# Patient Record
Sex: FEMALE | Race: BLACK OR AFRICAN AMERICAN | NOT HISPANIC OR LATINO | Employment: OTHER | URBAN - METROPOLITAN AREA
[De-identification: names, ages, dates, MRNs, and addresses within clinical notes are randomized per-mention and may not be internally consistent; named-entity substitution may affect disease eponyms.]

---

## 2023-08-24 DIAGNOSIS — I20.9 ANGINA PECTORIS (CMS-HCC): ICD-10-CM

## 2023-08-24 PROBLEM — R06.02 EXERTIONAL SHORTNESS OF BREATH: Status: ACTIVE | Noted: 2023-08-24

## 2023-08-24 PROBLEM — I25.10 CORONARY ARTERY DISEASE: Status: ACTIVE | Noted: 2023-08-24

## 2023-08-24 PROBLEM — H04.123 DRY EYES, BILATERAL: Status: ACTIVE | Noted: 2023-08-24

## 2023-08-24 PROBLEM — Z86.010 HISTORY OF ADENOMATOUS POLYP OF COLON: Status: ACTIVE | Noted: 2023-08-24

## 2023-08-24 PROBLEM — G25.89 SCAPULAR DYSKINESIS: Status: ACTIVE | Noted: 2023-08-24

## 2023-08-24 PROBLEM — I35.8 AORTIC SYSTOLIC MURMUR ON EXAMINATION: Status: ACTIVE | Noted: 2023-08-24

## 2023-08-24 PROBLEM — Z86.0101 HISTORY OF ADENOMATOUS POLYP OF COLON: Status: ACTIVE | Noted: 2023-08-24

## 2023-08-24 PROBLEM — Z86.79 HISTORY OF CORONARY ARTERY DISEASE: Status: ACTIVE | Noted: 2023-08-24

## 2023-08-24 PROBLEM — Z98.61 HISTORY OF PERCUTANEOUS TRANSLUMINAL CORONARY ANGIOPLASTY: Status: ACTIVE | Noted: 2023-08-24

## 2023-08-24 PROBLEM — R22.0 SWELLING OF UPPER LIP: Status: ACTIVE | Noted: 2023-08-24

## 2023-08-24 PROBLEM — M67.51 PLICA SYNDROME, RIGHT KNEE: Status: ACTIVE | Noted: 2023-08-24

## 2023-08-24 PROBLEM — M62.838 TRAPEZIUS MUSCLE SPASM: Status: ACTIVE | Noted: 2023-08-24

## 2023-08-24 PROBLEM — E11.9 CONTROLLED DIABETES MELLITUS TYPE II WITHOUT COMPLICATION (MULTI): Status: ACTIVE | Noted: 2023-08-24

## 2023-08-24 PROBLEM — E11.9 DIABETES MELLITUS (MULTI): Status: ACTIVE | Noted: 2023-08-24

## 2023-08-24 PROBLEM — M25.50 ARTHRALGIA OF MULTIPLE JOINTS: Status: ACTIVE | Noted: 2023-08-24

## 2023-08-24 PROBLEM — J40 BRONCHITIS: Status: ACTIVE | Noted: 2023-08-24

## 2023-08-24 PROBLEM — I10 HYPERTENSION: Status: ACTIVE | Noted: 2023-08-24

## 2023-08-24 PROBLEM — M25.561 RIGHT KNEE PAIN: Status: ACTIVE | Noted: 2023-08-24

## 2023-08-24 PROBLEM — G89.29 CHRONIC LOW BACK PAIN: Status: ACTIVE | Noted: 2023-08-24

## 2023-08-24 PROBLEM — R91.8 LUNG NODULE, MULTIPLE: Status: ACTIVE | Noted: 2023-08-24

## 2023-08-24 PROBLEM — H26.492 PCO (POSTERIOR CAPSULAR OPACIFICATION), LEFT: Status: ACTIVE | Noted: 2023-08-24

## 2023-08-24 PROBLEM — J44.89: Status: ACTIVE | Noted: 2023-08-24

## 2023-08-24 PROBLEM — I65.29 CAROTID STENOSIS: Status: ACTIVE | Noted: 2023-08-24

## 2023-08-24 PROBLEM — E78.00 HYPERCHOLESTEROLEMIA: Status: ACTIVE | Noted: 2023-08-24

## 2023-08-24 PROBLEM — R42 DIZZINESS: Status: ACTIVE | Noted: 2023-08-24

## 2023-08-24 PROBLEM — T78.40XA ALLERGIC REACTION: Status: ACTIVE | Noted: 2023-08-24

## 2023-08-24 PROBLEM — M54.50 CHRONIC LOW BACK PAIN: Status: ACTIVE | Noted: 2023-08-24

## 2023-08-24 PROBLEM — I73.9 PAD (PERIPHERAL ARTERY DISEASE) (CMS-HCC): Status: ACTIVE | Noted: 2023-08-24

## 2023-08-24 PROBLEM — Z96.1 PSEUDOPHAKIA OF BOTH EYES: Status: ACTIVE | Noted: 2023-08-24

## 2023-08-24 PROBLEM — F17.200 CURRENT SMOKER: Status: ACTIVE | Noted: 2023-08-24

## 2023-08-24 PROBLEM — I34.0 MITRAL REGURGITATION: Status: ACTIVE | Noted: 2023-08-24

## 2023-08-24 RX ORDER — ISOSORBIDE MONONITRATE 60 MG/1
1 TABLET, EXTENDED RELEASE ORAL DAILY
COMMUNITY
Start: 2018-04-27

## 2023-08-24 RX ORDER — METOPROLOL SUCCINATE 25 MG/1
1 TABLET, EXTENDED RELEASE ORAL DAILY
COMMUNITY
End: 2024-04-16 | Stop reason: ALTCHOICE

## 2023-08-27 RX ORDER — ISOSORBIDE MONONITRATE 60 MG/1
60 TABLET, EXTENDED RELEASE ORAL DAILY
Qty: 30 TABLET | Refills: 0 | Status: SHIPPED | OUTPATIENT
Start: 2023-08-27

## 2023-09-28 RX ORDER — AMLODIPINE BESYLATE 2.5 MG/1
2.5 TABLET ORAL DAILY
Qty: 90 TABLET | Refills: 3 | OUTPATIENT
Start: 2023-09-28

## 2023-10-02 DIAGNOSIS — E78.00 HYPERCHOLESTEROLEMIA: Primary | ICD-10-CM

## 2023-10-03 RX ORDER — ATORVASTATIN CALCIUM 80 MG/1
80 TABLET, FILM COATED ORAL NIGHTLY
Qty: 30 TABLET | Refills: 0 | Status: SHIPPED | OUTPATIENT
Start: 2023-10-03 | End: 2024-04-23

## 2023-10-20 DIAGNOSIS — I10 HYPERTENSION, UNSPECIFIED TYPE: ICD-10-CM

## 2023-10-23 RX ORDER — HYDROCHLOROTHIAZIDE 25 MG/1
TABLET ORAL
Qty: 90 TABLET | Refills: 0 | Status: SHIPPED | OUTPATIENT
Start: 2023-10-23 | End: 2024-04-23

## 2023-11-06 DIAGNOSIS — I10 PRIMARY HYPERTENSION: Primary | ICD-10-CM

## 2023-11-06 RX ORDER — METOPROLOL TARTRATE 50 MG/1
50 TABLET ORAL 2 TIMES DAILY
Qty: 60 TABLET | Refills: 0 | Status: SHIPPED | OUTPATIENT
Start: 2023-11-06 | End: 2024-04-23

## 2024-01-29 ENCOUNTER — TELEPHONE (OUTPATIENT)
Dept: PRIMARY CARE | Facility: CLINIC | Age: 79
End: 2024-01-29
Payer: COMMERCIAL

## 2024-04-16 ENCOUNTER — OFFICE VISIT (OUTPATIENT)
Dept: PRIMARY CARE | Facility: CLINIC | Age: 79
End: 2024-04-16
Payer: COMMERCIAL

## 2024-04-16 VITALS
RESPIRATION RATE: 16 BRPM | HEART RATE: 72 BPM | HEIGHT: 71 IN | BODY MASS INDEX: 26.18 KG/M2 | SYSTOLIC BLOOD PRESSURE: 130 MMHG | DIASTOLIC BLOOD PRESSURE: 70 MMHG | WEIGHT: 187 LBS

## 2024-04-16 DIAGNOSIS — I65.22 STENOSIS OF LEFT CAROTID ARTERY: ICD-10-CM

## 2024-04-16 DIAGNOSIS — J43.1 PANLOBULAR EMPHYSEMA (MULTI): ICD-10-CM

## 2024-04-16 DIAGNOSIS — I25.118 CORONARY ARTERY DISEASE OF NATIVE HEART WITH STABLE ANGINA PECTORIS, UNSPECIFIED VESSEL OR LESION TYPE (CMS-HCC): ICD-10-CM

## 2024-04-16 DIAGNOSIS — I65.22 OCCLUSION OF LEFT CAROTID ARTERY: ICD-10-CM

## 2024-04-16 DIAGNOSIS — I65.21 RIGHT-SIDED EXTRACRANIAL CAROTID ARTERY STENOSIS: Primary | ICD-10-CM

## 2024-04-16 DIAGNOSIS — I10 PRIMARY HYPERTENSION: ICD-10-CM

## 2024-04-16 DIAGNOSIS — G89.29 CHRONIC BILATERAL LOW BACK PAIN WITHOUT SCIATICA: ICD-10-CM

## 2024-04-16 DIAGNOSIS — M54.50 CHRONIC BILATERAL LOW BACK PAIN WITHOUT SCIATICA: ICD-10-CM

## 2024-04-16 DIAGNOSIS — M25.561 CHRONIC PAIN OF RIGHT KNEE: ICD-10-CM

## 2024-04-16 DIAGNOSIS — R01.1 HEART MURMUR: ICD-10-CM

## 2024-04-16 DIAGNOSIS — R91.8 LUNG NODULES: ICD-10-CM

## 2024-04-16 DIAGNOSIS — E11.9 CONTROLLED TYPE 2 DIABETES MELLITUS WITHOUT COMPLICATION, WITHOUT LONG-TERM CURRENT USE OF INSULIN (MULTI): ICD-10-CM

## 2024-04-16 DIAGNOSIS — I73.9 PAD (PERIPHERAL ARTERY DISEASE) (CMS-HCC): ICD-10-CM

## 2024-04-16 DIAGNOSIS — F17.219 CIGARETTE NICOTINE DEPENDENCE WITH NICOTINE-INDUCED DISORDER: Chronic | ICD-10-CM

## 2024-04-16 DIAGNOSIS — R53.83 OTHER FATIGUE: ICD-10-CM

## 2024-04-16 DIAGNOSIS — G89.29 CHRONIC PAIN OF RIGHT KNEE: ICD-10-CM

## 2024-04-16 PROCEDURE — 1159F MED LIST DOCD IN RCRD: CPT | Performed by: INTERNAL MEDICINE

## 2024-04-16 PROCEDURE — 3075F SYST BP GE 130 - 139MM HG: CPT | Performed by: INTERNAL MEDICINE

## 2024-04-16 PROCEDURE — 3078F DIAST BP <80 MM HG: CPT | Performed by: INTERNAL MEDICINE

## 2024-04-16 PROCEDURE — 99215 OFFICE O/P EST HI 40 MIN: CPT | Performed by: INTERNAL MEDICINE

## 2024-04-16 RX ORDER — AMLODIPINE BESYLATE 2.5 MG/1
1 TABLET ORAL DAILY
COMMUNITY
Start: 2021-08-01 | End: 2024-04-23

## 2024-04-16 RX ORDER — ASPIRIN 81 MG/1
1 TABLET ORAL DAILY
COMMUNITY

## 2024-04-16 RX ORDER — NITROGLYCERIN 0.3 MG/1
0.3 TABLET SUBLINGUAL EVERY 5 MIN PRN
COMMUNITY
Start: 2019-09-30

## 2024-04-16 RX ORDER — ALBUTEROL SULFATE 90 UG/1
2 AEROSOL, METERED RESPIRATORY (INHALATION) EVERY 4 HOURS PRN
COMMUNITY
Start: 2017-05-17

## 2024-04-16 ASSESSMENT — ENCOUNTER SYMPTOMS
EYES NEGATIVE: 1
RESPIRATORY NEGATIVE: 1
HEMATOLOGIC/LYMPHATIC NEGATIVE: 1
ALLERGIC/IMMUNOLOGIC NEGATIVE: 1
CARDIOVASCULAR NEGATIVE: 1
ENDOCRINE NEGATIVE: 1
GASTROINTESTINAL NEGATIVE: 1
MUSCULOSKELETAL NEGATIVE: 1
NEUROLOGICAL NEGATIVE: 1
PSYCHIATRIC NEGATIVE: 1
CONSTITUTIONAL NEGATIVE: 1

## 2024-04-16 NOTE — PROGRESS NOTES
"Subjective   Patient ID: gladis Brown is a 78 y.o. female who presents for New Patient Visit (New patient-est. [facility]). Feels \"numbly\" and hypersensitive low back pains due to disc disease     HPI     Review of Systems   Constitutional: Negative.    HENT: Negative.     Eyes: Negative.    Respiratory: Negative.     Cardiovascular: Negative.    Gastrointestinal: Negative.    Endocrine: Negative.    Musculoskeletal: Negative.    Skin: Negative.    Allergic/Immunologic: Negative.    Neurological: Negative.    Hematological: Negative.    Psychiatric/Behavioral: Negative.     All other systems reviewed and are negative.      Objective   Ht 1.803 m (5' 11\")   Wt 84.8 kg (187 lb)   BMI 26.08 kg/m²   Blood pressure 130/70, pulse 72, resp. rate 16, height 1.803 m (5' 11\"), weight 84.8 kg (187 lb).   Physical Exam  Vitals and nursing note reviewed.   Constitutional:       Appearance: Normal appearance.   HENT:      Head: Normocephalic and atraumatic.      Right Ear: Tympanic membrane, ear canal and external ear normal.      Left Ear: Tympanic membrane, ear canal and external ear normal. There is no impacted cerumen.      Nose: Nose normal.      Mouth/Throat:      Mouth: Mucous membranes are moist.      Pharynx: Oropharynx is clear.   Eyes:      Extraocular Movements: Extraocular movements intact.      Conjunctiva/sclera: Conjunctivae normal.      Pupils: Pupils are equal, round, and reactive to light.   Cardiovascular:      Rate and Rhythm: Normal rate and regular rhythm.      Pulses: Normal pulses.      Heart sounds: Normal heart sounds. No murmur heard.  Pulmonary:      Effort: Pulmonary effort is normal. No respiratory distress.      Breath sounds: Normal breath sounds. No stridor. No wheezing, rhonchi or rales.   Chest:      Chest wall: No tenderness.   Abdominal:      General: Abdomen is flat. Bowel sounds are normal. There is no distension.      Palpations: Abdomen is soft. There is no mass.      Tenderness: There " is no abdominal tenderness. There is no right CVA tenderness, left CVA tenderness, guarding or rebound.      Hernia: No hernia is present.   Musculoskeletal:         General: Normal range of motion.      Cervical back: Normal range of motion and neck supple.   Skin:     General: Skin is warm.      Capillary Refill: Capillary refill takes less than 2 seconds.   Neurological:      General: No focal deficit present.      Mental Status: She is alert.      Cranial Nerves: No cranial nerve deficit.      Sensory: No sensory deficit.      Motor: No weakness.      Coordination: Coordination normal.      Gait: Gait normal.      Deep Tendon Reflexes: Reflexes normal.   Psychiatric:         Mood and Affect: Mood normal.         Behavior: Behavior normal. Behavior is cooperative.         Thought Content: Thought content normal.         Judgment: Judgment normal.         Assessment/Plan   Problem List Items Addressed This Visit             ICD-10-CM    Chronic low back pain M54.50, G89.29     DDD - Degenerative disc disease of the Lumbo-Sacral (LS)/Cervical (C)  spine. Educate exercises and referred patient to Physical Therapy (PT). Ordered X-Ray's of the LS/C spine. Consider MRI. Radiculopathy in the distribution of L4-L5-S1/C3-C4-C5 nerve roots, needs NSAIDS (Naprosin 500mg BID vs. Arthrotec 75mg BID or Prednisone taper (Medrol dose pack), Flexeril 10 mg qHS, heat application, and pain control with Tylenol vs. Vicodin 5/325 mg TID.           Controlled diabetes mellitus type II without complication (Multi) E11.9     DM - NIDDM  . Reviewed with patient / Will check  HbA1c and fasting blood sugars. Educate home self monitoring and diary keeping(reviewed with patient home blood sugar levels /diary). Educate extensively low calorie diet and weight loss with exercise. Reviewed BS- diary and Rx. Regimen. [place] renal protection with ARB/ACEI.               Educate compliance with diet and Rx. And educate risks and autcomes.                                                  Coronary artery disease - Primary I25.10     CAD-coronary artery disease-patient has a history of myocardial infarction/stent placed in stenosed coronary arteries. Target LDL should be below 70 milligrams per deciliter.  Follows with her  cardiologist/He needs to call us with any new symptoms of angina or shortness of breath. Last stress test was/last coronary catheterization was/. Need to address risk factors BioCore controlling cholesterol blood pressure and diabetes. Educate extensively diet. Patient needs to follow in rehabilitation/mild exercises daily.          Relevant Medications    amLODIPine (Norvasc) 2.5 mg tablet    nitroglycerin (Nitrostat) 0.3 mg SL tablet    Hypertension I10     HTN - hypertension well/controlled .Target BP < 130/80  achieved. Educate low salt diet and exercise with weight loss. Educate home self monitoring and diary keeping. Educate risks of elevate blood pressure and benefits of prompt treatment.  Refill hydrochlorothiazide and Amlodipine and and metoprolol          PAD (peripheral artery disease) (CMS-[facility]) I73.9     Recommend tobacco cessation and Tobacco cessation - Educate risks of smoking (CAD/COPD/CANCER of the lung or urinary bladder/CVA). Educate life style changes and prescribe nicotine patch and Wellbutrin 150mg BID. Educate stress reduction.        And get a PVR                                                                               Carotid stenosis I65.29    Nicotine dependence (Chronic) F17.200     Tobacco cessation - Educate risks of smoking (CAD/COPD/CANCER of the lung or urinary bladder/CVA). Educate life style changes and prescribe nicotine patch and Wellbutrin 150mg BID. Educate stress reduction.                                                                                      Occlusion of left carotid artery I65.22     Monitor US of the Carotid arteries  and Tobacco cessation - Educate risks of smoking  (CAD/COPD/CANCER of the lung or urinary bladder/CVA). Educate life style changes and prescribe nicotine patch and Wellbutrin 150mg BID. Educate stress reduction.                                                                                      Panlobular emphysema (Multi) J43.1     COPD - Educate tobacco cessation extensively , no wheezing, no SOB, no Crackles heard/ Exacerbation.         Get CXR/CT for lung nodules.  Continues mild exercises and inhalers.  [place] preventive care and vaccinations.                                       Add advair inhalers and spiriva inhaler.               Total face to face time was 60 minutes , with more than 50% spent counseling the patient regarding diagnosis of Hypertension with aggressive treatment of hypercholesterolemia. Educate extensively diet and lifestyle changes and increase in physical activity and weight loss. Also educate the patient the risks of  untreated hypertension or untreated hypercholesterolemia  - educate risks of developing coronary artery disease , stroke or renal failure -

## 2024-04-16 NOTE — ASSESSMENT & PLAN NOTE
CAD-coronary artery disease-patient has a history of myocardial infarction/stent placed in stenosed coronary arteries. Target LDL should be below 70 milligrams per deciliter.  Follows with her  cardiologist/He needs to call us with any new symptoms of angina or shortness of breath. Last stress test was/last coronary catheterization was/. Need to address risk factors BioCore controlling cholesterol blood pressure and diabetes. Educate extensively diet. Patient needs to follow in rehabilitation/mild exercises daily.

## 2024-04-16 NOTE — ASSESSMENT & PLAN NOTE
Recommend tobacco cessation and Tobacco cessation - Educate risks of smoking (CAD/COPD/CANCER of the lung or urinary bladder/CVA). Educate life style changes and prescribe nicotine patch and Wellbutrin 150mg BID. Educate stress reduction.        And get a PVR

## 2024-04-16 NOTE — ASSESSMENT & PLAN NOTE
Monitor US of the Carotid arteries  and Tobacco cessation - Educate risks of smoking (CAD/COPD/CANCER of the lung or urinary bladder/CVA). Educate life style changes and prescribe nicotine patch and Wellbutrin 150mg BID. Educate stress reduction.

## 2024-04-16 NOTE — ASSESSMENT & PLAN NOTE
HTN - hypertension well/controlled .Target BP < 130/80  achieved. Educate low salt diet and exercise with weight loss. Educate home self monitoring and diary keeping. Educate risks of elevate blood pressure and benefits of prompt treatment.  Refill hydrochlorothiazide and Amlodipine and and metoprolol

## 2024-04-16 NOTE — ASSESSMENT & PLAN NOTE
DM - NIDDM  . Reviewed with patient / Will check  HbA1c and fasting blood sugars. Educate home self monitoring and diary keeping(reviewed with patient home blood sugar levels /diary). Educate extensively low calorie diet and weight loss with exercise. Reviewed BS- diary and Rx. Regimen. [place] renal protection with ARB/ACEI.               Educate compliance with diet and Rx. And educate risks and autcomes.

## 2024-04-16 NOTE — ASSESSMENT & PLAN NOTE
COPD - Educate tobacco cessation extensively , no wheezing, no SOB, no Crackles heard/ Exacerbation.         Get CXR/CT for lung nodules.  Continues mild exercises and inhalers.  [place] preventive care and vaccinations.                                       Add advair inhalers and spiriva inhaler.

## 2024-04-23 DIAGNOSIS — E78.00 HYPERCHOLESTEROLEMIA: ICD-10-CM

## 2024-04-23 DIAGNOSIS — I10 HYPERTENSION, UNSPECIFIED TYPE: ICD-10-CM

## 2024-04-23 DIAGNOSIS — I10 PRIMARY HYPERTENSION: ICD-10-CM

## 2024-04-23 RX ORDER — AMLODIPINE BESYLATE 2.5 MG/1
2.5 TABLET ORAL DAILY
Qty: 90 TABLET | Refills: 0 | Status: SHIPPED | OUTPATIENT
Start: 2024-04-23

## 2024-04-23 RX ORDER — HYDROCHLOROTHIAZIDE 25 MG/1
25 TABLET ORAL DAILY
Qty: 90 TABLET | Refills: 0 | Status: SHIPPED | OUTPATIENT
Start: 2024-04-23

## 2024-04-23 RX ORDER — ATORVASTATIN CALCIUM 80 MG/1
80 TABLET, FILM COATED ORAL DAILY
Qty: 90 TABLET | Refills: 0 | Status: SHIPPED | OUTPATIENT
Start: 2024-04-23

## 2024-04-23 RX ORDER — ISOSORBIDE MONONITRATE 60 MG/1
60 TABLET, EXTENDED RELEASE ORAL DAILY
Qty: 90 TABLET | Refills: 0 | Status: SHIPPED | OUTPATIENT
Start: 2024-04-23

## 2024-04-23 RX ORDER — METOPROLOL TARTRATE 50 MG/1
50 TABLET ORAL 2 TIMES DAILY
Qty: 90 TABLET | Refills: 0 | Status: SHIPPED | OUTPATIENT
Start: 2024-04-23 | End: 2024-05-02

## 2024-04-30 ENCOUNTER — HOSPITAL ENCOUNTER (OUTPATIENT)
Dept: RADIOLOGY | Facility: CLINIC | Age: 79
Discharge: HOME | End: 2024-04-30
Payer: COMMERCIAL

## 2024-04-30 ENCOUNTER — LAB (OUTPATIENT)
Dept: LAB | Facility: LAB | Age: 79
End: 2024-04-30
Payer: COMMERCIAL

## 2024-04-30 DIAGNOSIS — R91.8 LUNG NODULES: ICD-10-CM

## 2024-04-30 DIAGNOSIS — E11.9 CONTROLLED TYPE 2 DIABETES MELLITUS WITHOUT COMPLICATION, WITHOUT LONG-TERM CURRENT USE OF INSULIN (MULTI): ICD-10-CM

## 2024-04-30 DIAGNOSIS — R53.83 OTHER FATIGUE: ICD-10-CM

## 2024-04-30 DIAGNOSIS — I25.118 CORONARY ARTERY DISEASE OF NATIVE HEART WITH STABLE ANGINA PECTORIS, UNSPECIFIED VESSEL OR LESION TYPE (CMS-HCC): ICD-10-CM

## 2024-04-30 LAB
ALBUMIN SERPL BCP-MCNC: 4.1 G/DL (ref 3.4–5)
ALP SERPL-CCNC: 57 U/L (ref 33–136)
ALT SERPL W P-5'-P-CCNC: 29 U/L (ref 7–45)
ANION GAP SERPL CALC-SCNC: 12 MMOL/L (ref 10–20)
AST SERPL W P-5'-P-CCNC: 26 U/L (ref 9–39)
BASOPHILS # BLD AUTO: 0.07 X10*3/UL (ref 0–0.1)
BASOPHILS NFR BLD AUTO: 0.9 %
BILIRUB SERPL-MCNC: 0.9 MG/DL (ref 0–1.2)
BUN SERPL-MCNC: 16 MG/DL (ref 6–23)
CALCIUM SERPL-MCNC: 9.7 MG/DL (ref 8.6–10.6)
CHLORIDE SERPL-SCNC: 105 MMOL/L (ref 98–107)
CHOLEST SERPL-MCNC: 180 MG/DL (ref 0–199)
CHOLESTEROL/HDL RATIO: 1.9
CO2 SERPL-SCNC: 31 MMOL/L (ref 21–32)
CREAT SERPL-MCNC: 0.75 MG/DL (ref 0.5–1.05)
EGFRCR SERPLBLD CKD-EPI 2021: 82 ML/MIN/1.73M*2
EOSINOPHIL # BLD AUTO: 0.14 X10*3/UL (ref 0–0.4)
EOSINOPHIL NFR BLD AUTO: 1.7 %
ERYTHROCYTE [DISTWIDTH] IN BLOOD BY AUTOMATED COUNT: 14.5 % (ref 11.5–14.5)
EST. AVERAGE GLUCOSE BLD GHB EST-MCNC: 134 MG/DL
GLUCOSE SERPL-MCNC: 138 MG/DL (ref 74–99)
HBA1C MFR BLD: 6.3 %
HCT VFR BLD AUTO: 51.6 % (ref 36–46)
HDLC SERPL-MCNC: 95.5 MG/DL
HGB BLD-MCNC: 16.1 G/DL (ref 12–16)
IMM GRANULOCYTES # BLD AUTO: 0.02 X10*3/UL (ref 0–0.5)
IMM GRANULOCYTES NFR BLD AUTO: 0.2 % (ref 0–0.9)
LDLC SERPL CALC-MCNC: 73 MG/DL
LYMPHOCYTES # BLD AUTO: 2.69 X10*3/UL (ref 0.8–3)
LYMPHOCYTES NFR BLD AUTO: 33.5 %
MCH RBC QN AUTO: 29.1 PG (ref 26–34)
MCHC RBC AUTO-ENTMCNC: 31.2 G/DL (ref 32–36)
MCV RBC AUTO: 93 FL (ref 80–100)
MONOCYTES # BLD AUTO: 0.64 X10*3/UL (ref 0.05–0.8)
MONOCYTES NFR BLD AUTO: 8 %
NEUTROPHILS # BLD AUTO: 4.48 X10*3/UL (ref 1.6–5.5)
NEUTROPHILS NFR BLD AUTO: 55.7 %
NON HDL CHOLESTEROL: 85 MG/DL (ref 0–149)
NRBC BLD-RTO: 0 /100 WBCS (ref 0–0)
PLATELET # BLD AUTO: 233 X10*3/UL (ref 150–450)
POTASSIUM SERPL-SCNC: 4 MMOL/L (ref 3.5–5.3)
PROT SERPL-MCNC: 6.9 G/DL (ref 6.4–8.2)
RBC # BLD AUTO: 5.53 X10*6/UL (ref 4–5.2)
SODIUM SERPL-SCNC: 144 MMOL/L (ref 136–145)
TRIGL SERPL-MCNC: 60 MG/DL (ref 0–149)
TSH SERPL-ACNC: 2.56 MIU/L (ref 0.44–3.98)
VLDL: 12 MG/DL (ref 0–40)
WBC # BLD AUTO: 8 X10*3/UL (ref 4.4–11.3)

## 2024-04-30 PROCEDURE — 71250 CT THORAX DX C-: CPT

## 2024-04-30 PROCEDURE — 83036 HEMOGLOBIN GLYCOSYLATED A1C: CPT

## 2024-04-30 PROCEDURE — 80053 COMPREHEN METABOLIC PANEL: CPT

## 2024-04-30 PROCEDURE — 84443 ASSAY THYROID STIM HORMONE: CPT

## 2024-04-30 PROCEDURE — 71250 CT THORAX DX C-: CPT | Performed by: RADIOLOGY

## 2024-04-30 PROCEDURE — 36415 COLL VENOUS BLD VENIPUNCTURE: CPT

## 2024-04-30 PROCEDURE — 80061 LIPID PANEL: CPT

## 2024-04-30 PROCEDURE — 85025 COMPLETE CBC W/AUTO DIFF WBC: CPT

## 2024-05-02 ENCOUNTER — ANCILLARY PROCEDURE (OUTPATIENT)
Dept: VASCULAR MEDICINE | Facility: CLINIC | Age: 79
End: 2024-05-02
Payer: COMMERCIAL

## 2024-05-02 DIAGNOSIS — I10 PRIMARY HYPERTENSION: ICD-10-CM

## 2024-05-02 DIAGNOSIS — I65.21 RIGHT-SIDED EXTRACRANIAL CAROTID ARTERY STENOSIS: ICD-10-CM

## 2024-05-02 PROCEDURE — 93880 EXTRACRANIAL BILAT STUDY: CPT | Performed by: SURGERY

## 2024-05-02 PROCEDURE — 93880 EXTRACRANIAL BILAT STUDY: CPT

## 2024-05-02 RX ORDER — METOPROLOL TARTRATE 50 MG/1
50 TABLET ORAL 2 TIMES DAILY
Qty: 180 TABLET | Refills: 0 | Status: SHIPPED | OUTPATIENT
Start: 2024-05-02

## 2024-05-14 ENCOUNTER — ANCILLARY PROCEDURE (OUTPATIENT)
Dept: CARDIOLOGY | Facility: CLINIC | Age: 79
End: 2024-05-14
Payer: COMMERCIAL

## 2024-05-14 DIAGNOSIS — R01.1 HEART MURMUR: ICD-10-CM

## 2024-05-14 PROCEDURE — 93306 TTE W/DOPPLER COMPLETE: CPT | Performed by: INTERNAL MEDICINE

## 2024-05-14 PROCEDURE — 93306 TTE W/DOPPLER COMPLETE: CPT

## 2024-05-15 LAB
AORTIC VALVE PEAK VELOCITY: 1.67 M/S
AV PEAK GRADIENT: 11.2 MMHG
AVA (PEAK VEL): 2.06 CM2
EJECTION FRACTION APICAL 4 CHAMBER: 62.4
LEFT ATRIUM VOLUME AREA LENGTH INDEX BSA: 25.9 ML/M2
LEFT VENTRICLE INTERNAL DIMENSION DIASTOLE: 3.58 CM (ref 3.5–6)
LEFT VENTRICULAR OUTFLOW TRACT DIAMETER: 1.72 CM
LV EJECTION FRACTION BIPLANE: 66 %
MITRAL VALVE E/A RATIO: 0.41
MITRAL VALVE E/E' RATIO: 17.11
RIGHT VENTRICLE FREE WALL PEAK S': 11 CM/S
RIGHT VENTRICLE PEAK SYSTOLIC PRESSURE: 44.5 MMHG
TRICUSPID ANNULAR PLANE SYSTOLIC EXCURSION: 1.4 CM

## 2024-06-18 ENCOUNTER — APPOINTMENT (OUTPATIENT)
Dept: PRIMARY CARE | Facility: CLINIC | Age: 79
End: 2024-06-18
Payer: COMMERCIAL

## 2024-06-18 VITALS
BODY MASS INDEX: 26.18 KG/M2 | HEART RATE: 55 BPM | DIASTOLIC BLOOD PRESSURE: 62 MMHG | RESPIRATION RATE: 16 BRPM | SYSTOLIC BLOOD PRESSURE: 120 MMHG | WEIGHT: 187 LBS | HEIGHT: 71 IN | OXYGEN SATURATION: 95 %

## 2024-06-18 DIAGNOSIS — M16.11 PRIMARY OSTEOARTHRITIS OF RIGHT HIP: ICD-10-CM

## 2024-06-18 DIAGNOSIS — I83.93 VARICOSE VEINS OF BOTH LOWER EXTREMITIES, UNSPECIFIED WHETHER COMPLICATED: ICD-10-CM

## 2024-06-18 DIAGNOSIS — I10 PRIMARY HYPERTENSION: ICD-10-CM

## 2024-06-18 DIAGNOSIS — I10 HYPERTENSION, UNSPECIFIED TYPE: ICD-10-CM

## 2024-06-18 DIAGNOSIS — M79.604 RIGHT LEG PAIN: ICD-10-CM

## 2024-06-18 DIAGNOSIS — I25.118 CORONARY ARTERY DISEASE OF NATIVE HEART WITH STABLE ANGINA PECTORIS, UNSPECIFIED VESSEL OR LESION TYPE (CMS-HCC): ICD-10-CM

## 2024-06-18 DIAGNOSIS — J43.1 PANLOBULAR EMPHYSEMA (MULTI): Primary | ICD-10-CM

## 2024-06-18 DIAGNOSIS — J45.40 MODERATE PERSISTENT ASTHMA, UNSPECIFIED WHETHER COMPLICATED (HHS-HCC): ICD-10-CM

## 2024-06-18 DIAGNOSIS — E11.9 CONTROLLED TYPE 2 DIABETES MELLITUS WITHOUT COMPLICATION, WITHOUT LONG-TERM CURRENT USE OF INSULIN (MULTI): ICD-10-CM

## 2024-06-18 DIAGNOSIS — F17.200 CURRENT SMOKER: ICD-10-CM

## 2024-06-18 DIAGNOSIS — E78.00 HYPERCHOLESTEROLEMIA: ICD-10-CM

## 2024-06-18 PROBLEM — R01.1 HEART MURMUR: Status: ACTIVE | Noted: 2024-06-18

## 2024-06-18 PROBLEM — R53.83 FATIGUE: Status: ACTIVE | Noted: 2024-06-18

## 2024-06-18 PROBLEM — Z86.39 HISTORY OF ELEVATED LIPIDS: Status: ACTIVE | Noted: 2024-06-18

## 2024-06-18 PROBLEM — H52.4 PRESBYOPIA: Status: ACTIVE | Noted: 2018-06-05

## 2024-06-18 PROCEDURE — 3078F DIAST BP <80 MM HG: CPT | Performed by: INTERNAL MEDICINE

## 2024-06-18 PROCEDURE — 1159F MED LIST DOCD IN RCRD: CPT | Performed by: INTERNAL MEDICINE

## 2024-06-18 PROCEDURE — 99214 OFFICE O/P EST MOD 30 MIN: CPT | Performed by: INTERNAL MEDICINE

## 2024-06-18 PROCEDURE — 3074F SYST BP LT 130 MM HG: CPT | Performed by: INTERNAL MEDICINE

## 2024-06-18 RX ORDER — ASPIRIN 81 MG/1
81 TABLET ORAL DAILY
Qty: 90 TABLET | Refills: 2 | Status: CANCELLED | OUTPATIENT
Start: 2024-06-18

## 2024-06-18 RX ORDER — HYDROCHLOROTHIAZIDE 25 MG/1
25 TABLET ORAL DAILY
Qty: 90 TABLET | Refills: 0 | Status: SHIPPED | OUTPATIENT
Start: 2024-06-18

## 2024-06-18 RX ORDER — ALBUTEROL SULFATE 90 UG/1
2 AEROSOL, METERED RESPIRATORY (INHALATION) EVERY 4 HOURS PRN
Qty: 18 G | Refills: 2 | Status: SHIPPED | OUTPATIENT
Start: 2024-06-18

## 2024-06-18 RX ORDER — AMLODIPINE BESYLATE 2.5 MG/1
2.5 TABLET ORAL DAILY
Qty: 90 TABLET | Refills: 0 | Status: SHIPPED | OUTPATIENT
Start: 2024-06-18

## 2024-06-18 RX ORDER — ATORVASTATIN CALCIUM 80 MG/1
80 TABLET, FILM COATED ORAL DAILY
Qty: 90 TABLET | Refills: 0 | Status: SHIPPED | OUTPATIENT
Start: 2024-06-18

## 2024-06-18 ASSESSMENT — ENCOUNTER SYMPTOMS
OCCASIONAL FEELINGS OF UNSTEADINESS: 0
PSYCHIATRIC NEGATIVE: 1
GASTROINTESTINAL NEGATIVE: 1
NEUROLOGICAL NEGATIVE: 1
HEMATOLOGIC/LYMPHATIC NEGATIVE: 1
CONSTITUTIONAL NEGATIVE: 1
EYES NEGATIVE: 1
CARDIOVASCULAR NEGATIVE: 1
ENDOCRINE NEGATIVE: 1
ALLERGIC/IMMUNOLOGIC NEGATIVE: 1
DEPRESSION: 0
MUSCULOSKELETAL NEGATIVE: 1
LOSS OF SENSATION IN FEET: 0
RESPIRATORY NEGATIVE: 1

## 2024-06-18 ASSESSMENT — PATIENT HEALTH QUESTIONNAIRE - PHQ9
SUM OF ALL RESPONSES TO PHQ9 QUESTIONS 1 AND 2: 0
1. LITTLE INTEREST OR PLEASURE IN DOING THINGS: NOT AT ALL
2. FEELING DOWN, DEPRESSED OR HOPELESS: NOT AT ALL

## 2024-06-18 NOTE — ASSESSMENT & PLAN NOTE
Hypercholesterolemia - Monitor lipid profile and educate patient upon risks of high cholesterol and targets. Educate diet and change in lifestyle and increase in exercises - Refill:  Atorvastatin   80 mg daily  and educate compliance with medication and diet.

## 2024-06-18 NOTE — ASSESSMENT & PLAN NOTE
CAD-coronary artery disease-patient has a history of myocardial infarction/. Target LDL should be below 70 milligrams per deciliter.  Follows with her  cardiologist/He needs to call us with any new symptoms of angina or shortness of breath. Last stress test was/last coronary catheterization was/. Need to address risk factors BioCore controlling cholesterol blood pressure and diabetes. Refer to cardiology

## 2024-06-18 NOTE — ASSESSMENT & PLAN NOTE
COPD - Educate tobacco cessation extensively , no wheezing, no SOB, no Crackles heard/ Exacerbation.         Get CXR/CT for lung nodules.  Continues mild exercises and inhalers.  [place] preventive care and vaccinations.                                       Add advair inhalers and spiriva inhaler.

## 2024-06-18 NOTE — ASSESSMENT & PLAN NOTE
DM - NIDDM . Reviewed with patient / Will check HbA1c and fasting blood sugars. Educate home self monitoring and diary keeping(reviewed with patient home blood sugar levels /diary). Educate extensively low calorie diet and weight loss with exercise. Reviewed BS- diary and Rx. Regimen. [place] renal protection with ARB/ACEI. Educate compliance with diet and Rx. And educate risks and autcomes.

## 2024-06-18 NOTE — PROGRESS NOTES
"Subjective   Patient ID: gladis Brown is a 79 y.o. female who presents for Follow-up (2 month). dyspnea of exertion (mild exertion) and leg pains and breathing problems and knees pains can not standing or walking much     HPI     Review of Systems   Constitutional: Negative.    HENT: Negative.     Eyes: Negative.    Respiratory: Negative.     Cardiovascular: Negative.    Gastrointestinal: Negative.    Endocrine: Negative.    Musculoskeletal: Negative.    Skin: Negative.    Allergic/Immunologic: Negative.    Neurological: Negative.    Hematological: Negative.    Psychiatric/Behavioral: Negative.     All other systems reviewed and are negative.      Objective   Pulse 55   Resp 16   Ht 1.803 m (5' 11\")   Wt 84.8 kg (187 lb)   SpO2 95%   BMI 26.08 kg/m²   Blood pressure 120/62, pulse 55, resp. rate 16, height 1.803 m (5' 11\"), weight 84.8 kg (187 lb), SpO2 95%.   Physical Exam  Vitals and nursing note reviewed.   Constitutional:       Appearance: Normal appearance.   HENT:      Head: Normocephalic and atraumatic.      Right Ear: Tympanic membrane, ear canal and external ear normal.      Left Ear: Tympanic membrane, ear canal and external ear normal. There is no impacted cerumen.      Nose: Nose normal.      Mouth/Throat:      Mouth: Mucous membranes are moist.      Pharynx: Oropharynx is clear.   Eyes:      Extraocular Movements: Extraocular movements intact.      Conjunctiva/sclera: Conjunctivae normal.      Pupils: Pupils are equal, round, and reactive to light.   Cardiovascular:      Rate and Rhythm: Normal rate and regular rhythm.      Pulses: Normal pulses.      Heart sounds: Normal heart sounds. No murmur heard.  Pulmonary:      Effort: Pulmonary effort is normal. No respiratory distress.      Breath sounds: Normal breath sounds. No stridor. No wheezing, rhonchi or rales.   Chest:      Chest wall: No tenderness.   Abdominal:      General: Abdomen is flat. Bowel sounds are normal. There is no distension. "      Palpations: Abdomen is soft. There is no mass.      Tenderness: There is no abdominal tenderness. There is no right CVA tenderness, left CVA tenderness, guarding or rebound.      Hernia: No hernia is present.   Musculoskeletal:         General: Normal range of motion.      Cervical back: Normal range of motion and neck supple.   Skin:     General: Skin is warm.      Capillary Refill: Capillary refill takes less than 2 seconds.   Neurological:      General: No focal deficit present.      Mental Status: She is alert.      Cranial Nerves: No cranial nerve deficit.      Sensory: No sensory deficit.      Motor: No weakness.      Coordination: Coordination normal.      Gait: Gait normal.      Deep Tendon Reflexes: Reflexes normal.   Psychiatric:         Mood and Affect: Mood normal.         Behavior: Behavior normal. Behavior is cooperative.         Thought Content: Thought content normal.         Judgment: Judgment normal.         Assessment/Plan   Problem List Items Addressed This Visit             ICD-10-CM    Controlled diabetes mellitus type II without complication (Multi) E11.9     DM - NIDDM . Reviewed with patient / Will check HbA1c and fasting blood sugars. Educate home self monitoring and diary keeping(reviewed with patient home blood sugar levels /diary). Educate extensively low calorie diet and weight loss with exercise. Reviewed BS- diary and Rx. Regimen. [place] renal protection with ARB/ACEI. Educate compliance with diet and Rx. And educate risks and autcomes.          Coronary artery disease I25.10     CAD-coronary artery disease-patient has a history of myocardial infarction/. Target LDL should be below 70 milligrams per deciliter.  Follows with her  cardiologist/He needs to call us with any new symptoms of angina or shortness of breath. Last stress test was/last coronary catheterization was/. Need to address risk factors BioCore controlling cholesterol blood pressure and diabetes. Refer to  cardiology          Relevant Medications    amLODIPine (Norvasc) 2.5 mg tablet    Other Relevant Orders    Referral to Cardiology    Current smoker F17.200     Tobacco cessation - Educate risks of smoking (CAD/COPD/CANCER of the lung or urinary bladder/CVA). Educate life style changes and prescribe nicotine patch and Wellbutrin 150mg BID. Educate stress reduction.                                                                                      Hypercholesterolemia E78.00     Hypercholesterolemia - Monitor lipid profile and educate patient upon risks of high cholesterol and targets. Educate diet and change in lifestyle and increase in exercises - Refill:  Atorvastatin   80 mg daily  and educate compliance with medication and diet.           Relevant Medications    atorvastatin (Lipitor) 80 mg tablet    Hypertension I10     HTN - hypertension well/controlled .Target BP < 130/80  achieved. Educate low salt diet and exercise with weight loss. Educate home self monitoring and diary keeping. Educate risks of elevate blood pressure and benefits of prompt treatment.  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         Get CXR/CT for lung nodules.  Continues mild exercises and inhalers.  [place] preventive care and vaccinations.                                       Add advair inhalers and spiriva inhaler.           Primary osteoarthritis of right hip  M16.11     Right hip pain and DJD advanced - refer to ortho and get X rays of the hip          Relevant Orders    XR hip right with pelvis when performed 2 or 3 views     Other Visit Diagnoses         Codes    Moderate persistent asthma, unspecified whether complicated (HHS-HCC)    -  Primary J45.40    Relevant Medications    albuterol (ProAir HFA) 90 mcg/actuation inhaler            Chronic low back pain M54.50, G89.29        DDD - Degenerative disc disease of the Lumbo-Sacral (LS)/Cervical (C)  spine. Educate exercises and referred patient to Physical Therapy (PT). Ordered X-Ray's of the LS/C spine. Consider MRI. Radiculopathy in the distribution of L4-L5-S1/C3-C4-C5 nerve roots, needs NSAIDS (Naprosin 500mg BID vs. Arthrotec 75mg BID or Prednisone taper (Medrol dose pack), Flexeril 10 mg qHS, heat application, and pain control with Tylenol vs. Vicodin 5/325 mg TID.             Controlled diabetes mellitus type II without complication (Multi) E11.9       DM - NIDDM  . Reviewed with patient / Will check  HbA1c and fasting blood sugars. Educate home self monitoring and diary keeping(reviewed with patient home blood sugar levels /diary). Educate extensively low calorie diet and weight loss with exercise. Reviewed BS- diary and Rx. Regimen. [place] renal protection with ARB/ACEI.               Educate compliance with diet and Rx. And educate risks and autcomes.                                                   Coronary artery disease - Primary I25.10       CAD-coronary artery disease-patient has a history of myocardial infarction/stent placed in stenosed coronary arteries. Target LDL should be below 70 milligrams per deciliter.  Follows with her  cardiologist/He needs to call us with any new symptoms of angina or shortness of breath. Last stress test was/last coronary catheterization was/. Need to address risk factors BioCore controlling cholesterol blood pressure and diabetes. Educate extensively diet. Patient  needs to follow in rehabilitation/mild exercises daily.            Relevant Medications     amLODIPine (Norvasc) 2.5 mg tablet     nitroglycerin (Nitrostat) 0.3 mg SL tablet     Hypertension I10       HTN - hypertension well/controlled .Target BP < 130/80  achieved. Educate low salt diet and exercise with weight loss. Educate home self monitoring and diary keeping. Educate risks of elevate blood pressure and benefits of prompt treatment.  Refill hydrochlorothiazide and Amlodipine and and metoprolol            PAD (peripheral artery disease) (CMS-HCC) I73.9       Recommend tobacco cessation and Tobacco cessation - Educate risks of smoking (CAD/COPD/CANCER of the lung or urinary bladder/CVA). Educate life style changes and prescribe nicotine patch and Wellbutrin 150mg BID. Educate stress reduction.        And get a PVR                                                                                 Carotid stenosis I65.29     Nicotine dependence (Chronic) F17.200       Tobacco cessation - Educate risks of smoking (CAD/COPD/CANCER of the lung or urinary bladder/CVA). Educate life style changes and prescribe nicotine patch and Wellbutrin 150mg BID. Educate stress reduction.                                                                                        Occlusion of left carotid artery I65.22       Monitor US of the Carotid arteries  and Tobacco cessation - Educate risks of smoking (CAD/COPD/CANCER of the lung or urinary bladder/CVA). Educate life style changes and prescribe nicotine patch and Wellbutrin 150mg BID. Educate stress reduction.                                                                                        Panlobular emphysema (Multi) J43.1       COPD - Educate tobacco cessation extensively , no wheezing, no SOB, no Crackles heard/ Exacerbation.         Get CXR/CT for lung nodules.  Continues mild exercises and inhalers.  [place] preventive care and vaccinations.                                        Add advair inhalers and spiriva inhaler.                  Total face to face time was 60 minutes , with more than 50% spent counseling the patient regarding diagnosis of Hypertension with aggressive treatment of hypercholesterolemia. Educate extensively diet and lifestyle changes and increase in physical activity and weight loss. Also educate the patient the risks of  untreated hypertension or untreated hypercholesterolemia  - educate risks of developing coronary artery disease , stroke or renal failure -               Immunizations/Injections      very important  Immunizations from outside sources need reconciliation.      Influenza, High Dose Seasonal, Preservative Free9/24/2020, 9/27/2018, 8/24/2017  Influenza, Xqqyurlekkk04/18/2016, 9/29/2015, 12/9/2014,  ... (2 more)  Mumps2/22/2012  Pfizer COVID-19 vaccine, Fall 2023, 12 years and older, (30mcg/0.3mL)3/18/2024  Pfizer Purple Cap SARS-CoV-210/25/2021, 4/5/2021, 3/8/2021  Pneumococcal conjugate vaccine, 13-valent (PREVNAR 13)6/21/2018

## 2024-06-25 ENCOUNTER — HOSPITAL ENCOUNTER (OUTPATIENT)
Dept: RADIOLOGY | Facility: CLINIC | Age: 79
Discharge: HOME | End: 2024-06-25
Payer: COMMERCIAL

## 2024-06-25 DIAGNOSIS — M16.11 PRIMARY OSTEOARTHRITIS OF RIGHT HIP: ICD-10-CM

## 2024-06-25 PROCEDURE — 73502 X-RAY EXAM HIP UNI 2-3 VIEWS: CPT | Mod: RIGHT SIDE | Performed by: RADIOLOGY

## 2024-06-25 PROCEDURE — 73502 X-RAY EXAM HIP UNI 2-3 VIEWS: CPT | Mod: RT

## 2024-06-29 DIAGNOSIS — I10 PRIMARY HYPERTENSION: ICD-10-CM

## 2024-07-01 RX ORDER — ISOSORBIDE MONONITRATE 60 MG/1
60 TABLET, EXTENDED RELEASE ORAL DAILY
Qty: 90 TABLET | Refills: 0 | Status: SHIPPED | OUTPATIENT
Start: 2024-07-01

## 2024-07-11 ENCOUNTER — OFFICE VISIT (OUTPATIENT)
Dept: CARDIOLOGY | Facility: CLINIC | Age: 79
End: 2024-07-11
Payer: COMMERCIAL

## 2024-07-11 ENCOUNTER — APPOINTMENT (OUTPATIENT)
Dept: PRIMARY CARE | Facility: CLINIC | Age: 79
End: 2024-07-11
Payer: COMMERCIAL

## 2024-07-11 VITALS
BODY MASS INDEX: 25.2 KG/M2 | DIASTOLIC BLOOD PRESSURE: 73 MMHG | HEART RATE: 61 BPM | HEIGHT: 71 IN | WEIGHT: 180 LBS | OXYGEN SATURATION: 93 % | SYSTOLIC BLOOD PRESSURE: 105 MMHG

## 2024-07-11 VITALS
DIASTOLIC BLOOD PRESSURE: 65 MMHG | WEIGHT: 181 LBS | RESPIRATION RATE: 16 BRPM | SYSTOLIC BLOOD PRESSURE: 100 MMHG | OXYGEN SATURATION: 93 % | HEART RATE: 60 BPM | BODY MASS INDEX: 25.24 KG/M2

## 2024-07-11 DIAGNOSIS — E11.9 CONTROLLED TYPE 2 DIABETES MELLITUS WITHOUT COMPLICATION, WITHOUT LONG-TERM CURRENT USE OF INSULIN (MULTI): ICD-10-CM

## 2024-07-11 DIAGNOSIS — I10 PRIMARY HYPERTENSION: ICD-10-CM

## 2024-07-11 DIAGNOSIS — I65.22 OCCLUSION OF LEFT CAROTID ARTERY: ICD-10-CM

## 2024-07-11 DIAGNOSIS — M70.61 GREATER TROCHANTERIC BURSITIS OF RIGHT HIP: Primary | ICD-10-CM

## 2024-07-11 DIAGNOSIS — I44.7 LBBB (LEFT BUNDLE BRANCH BLOCK): ICD-10-CM

## 2024-07-11 DIAGNOSIS — I25.10 CORONARY ARTERY DISEASE, UNSPECIFIED VESSEL OR LESION TYPE, UNSPECIFIED WHETHER ANGINA PRESENT, UNSPECIFIED WHETHER NATIVE OR TRANSPLANTED HEART: Primary | ICD-10-CM

## 2024-07-11 DIAGNOSIS — E78.00 HYPERCHOLESTEROLEMIA: ICD-10-CM

## 2024-07-11 DIAGNOSIS — I25.118 CORONARY ARTERY DISEASE OF NATIVE HEART WITH STABLE ANGINA PECTORIS, UNSPECIFIED VESSEL OR LESION TYPE (CMS-HCC): ICD-10-CM

## 2024-07-11 DIAGNOSIS — I35.8 AORTIC SYSTOLIC MURMUR ON EXAMINATION: ICD-10-CM

## 2024-07-11 DIAGNOSIS — I73.9 PAD (PERIPHERAL ARTERY DISEASE) (CMS-HCC): ICD-10-CM

## 2024-07-11 PROCEDURE — 1160F RVW MEDS BY RX/DR IN RCRD: CPT | Performed by: INTERNAL MEDICINE

## 2024-07-11 PROCEDURE — 93010 ELECTROCARDIOGRAM REPORT: CPT | Performed by: INTERNAL MEDICINE

## 2024-07-11 PROCEDURE — 3074F SYST BP LT 130 MM HG: CPT | Performed by: INTERNAL MEDICINE

## 2024-07-11 PROCEDURE — 1125F AMNT PAIN NOTED PAIN PRSNT: CPT | Performed by: INTERNAL MEDICINE

## 2024-07-11 PROCEDURE — 99204 OFFICE O/P NEW MOD 45 MIN: CPT | Performed by: INTERNAL MEDICINE

## 2024-07-11 PROCEDURE — 93005 ELECTROCARDIOGRAM TRACING: CPT | Performed by: INTERNAL MEDICINE

## 2024-07-11 PROCEDURE — G2211 COMPLEX E/M VISIT ADD ON: HCPCS | Performed by: INTERNAL MEDICINE

## 2024-07-11 PROCEDURE — 99214 OFFICE O/P EST MOD 30 MIN: CPT | Mod: 25 | Performed by: INTERNAL MEDICINE

## 2024-07-11 PROCEDURE — 1159F MED LIST DOCD IN RCRD: CPT | Performed by: INTERNAL MEDICINE

## 2024-07-11 PROCEDURE — 3078F DIAST BP <80 MM HG: CPT | Performed by: INTERNAL MEDICINE

## 2024-07-11 RX ORDER — LIDOCAINE HCL/EPINEPHRINE/PF 2%-1:200K
6 VIAL (ML) INJECTION ONCE
Status: COMPLETED | OUTPATIENT
Start: 2024-07-11 | End: 2024-07-11

## 2024-07-11 RX ORDER — TRIAMCINOLONE ACETONIDE 40 MG/ML
120 INJECTION, SUSPENSION INTRA-ARTICULAR; INTRAMUSCULAR ONCE
Status: COMPLETED | OUTPATIENT
Start: 2024-07-11 | End: 2024-07-11

## 2024-07-11 ASSESSMENT — ENCOUNTER SYMPTOMS
PSYCHIATRIC NEGATIVE: 1
OCCASIONAL FEELINGS OF UNSTEADINESS: 0
LOSS OF SENSATION IN FEET: 0
DEPRESSION: 0
LOSS OF SENSATION IN FEET: 0
CARDIOVASCULAR NEGATIVE: 1
RESPIRATORY NEGATIVE: 1
DEPRESSION: 0
EYES NEGATIVE: 1
HEMATOLOGIC/LYMPHATIC NEGATIVE: 1
CONSTITUTIONAL NEGATIVE: 1
MUSCULOSKELETAL NEGATIVE: 1
NEUROLOGICAL NEGATIVE: 1
ALLERGIC/IMMUNOLOGIC NEGATIVE: 1
HIP PAIN: 1
GASTROINTESTINAL NEGATIVE: 1
OCCASIONAL FEELINGS OF UNSTEADINESS: 0
ENDOCRINE NEGATIVE: 1

## 2024-07-11 ASSESSMENT — PATIENT HEALTH QUESTIONNAIRE - PHQ9
SUM OF ALL RESPONSES TO PHQ9 QUESTIONS 1 AND 2: 0
2. FEELING DOWN, DEPRESSED OR HOPELESS: NOT AT ALL
1. LITTLE INTEREST OR PLEASURE IN DOING THINGS: NOT AT ALL

## 2024-07-11 ASSESSMENT — PAIN SCALES - GENERAL
PAINLEVEL_OUTOF10: 10 - WORST POSSIBLE PAIN
PAINLEVEL: 10-WORST PAIN EVER

## 2024-07-11 NOTE — ASSESSMENT & PLAN NOTE
DM - NIDDM  . Reviewed with patient / Will check  HbA1c and fasting blood sugars. Educate home self monitoring and diary keeping(reviewed with patient home blood sugar levels /diary). Educate extensively low calorie diet and weight loss with exercise. Reviewed BS- diary and Rx. Regimen. [place] renal protection with ARB/ACEI.               Educate compliance with diet and Rx. And educate risks and autcomes.

## 2024-07-11 NOTE — PROGRESS NOTES
"Referred by Dr. Amaya for New Patient Visit (Pt c/o SOB with exertion)     HPI:    Briana Brown is a 79 y.o. female with pertinent history of Coronary artery disease, aortic stenosis, left bundle branch block on prior EKG, mitral regurgitation, peripheral arterial disease who presents to cardiology clinic to establish care.     She reports that in [id number], she had noticed  bilateral wrist and arm pain. She had been referred to cardiology. EKG and \"Stuff\" were done and a heart catheterization was perfomred. It showed \"Blockages in the back of the heart\" these are medically managed.  Since that time, she has done well and avoided cardiovascular care. She notes that recently emergent cardiac care.  She was last seen by Dr. ROSEANNE Green in 2021.  At that time, it was recommended that she repeat stress testing, echocardiogram and to refrain from smoking.  Her stress test at that time showed a fixed defect in the basal to mid inferior wall consistent with prior infarction and an ejection fraction estimated at 48% by nuclear studies.  Given the patient's symptoms and in order to further evaluate possible arrhythmias, we will plan to perform an event monitor.  Echocardiogram was then obtained which showed ejection fraction close to 6065%, severe concentric LVH with impaired relaxation diastology.  She has noted to have mild aortic stenosis with a mean gradient of 6.4 mm.    No exacerbating or relieving factors.  Patient denies chest pain and angina.  Pt denies orthopnea, and paroxysmal nocturnal dyspnea.  Pt denies worsening lower extremity edema.  Pt denies palpitations or syncope.  No recent falls.  No fever or chills.  No cough.  No change in bowel or bladder habits.  No sick contacts.  No recent travel.    12 point review of systems including (Constitutional, Eyes, ENMT, Respiratory, Cardiac, Gastrointestinal, Neurological, Psychiatric, and Hematologic) was performed and is otherwise negative.    Past medical " history reviewed:   has a past medical history of Atherosclerotic heart disease of native coronary artery without angina pectoris (2021), Essential (primary) hypertension (2022), Occlusion and stenosis of left carotid artery, Peripheral vascular disease, unspecified (CMS-HCC) (2017), Personal history of colonic polyps, Personal history of nicotine dependence, Personal history of other diseases of the circulatory system (04/15/2014), Personal history of other diseases of the circulatory system, Personal history of other diseases of the musculoskeletal system and connective tissue (04/15/2014), Personal history of other diseases of the musculoskeletal system and connective tissue (04/15/2014), Personal history of other diseases of the nervous system and sense organs, Personal history of other diseases of the respiratory system, Personal history of other diseases of the respiratory system (2017), Personal history of other endocrine, nutritional and metabolic disease, Personal history of other medical treatment, Presbyopia (2018), Presbyopia (2018), Pure hypercholesterolemia, unspecified (2022), Stricture of artery (CMS-formerly [facility]), and Type 2 diabetes mellitus without complications (Multi) (2020).    Past surgical history reviewed:   has a past surgical history that includes Cataract extraction (04/15/2014); Tubal ligation (04/15/2014); Appendectomy (04/15/2014); Other surgical history (2018); and Other surgical history (2018).    Social history reviewed:   reports that she has been smoking cigarettes. She has never used smokeless tobacco. She reports current alcohol use of about 7.0 - 8.0 standard drinks of alcohol per week. She reports that she does not use drugs.     Family history reviewed:    Brother had rheumatic heart disease and has a pig valve, mother had rheumatic heart disease  at age 59 post surgery for bypass,     Allergies reviewed: Kiwi,  "Lisinopril, Penicillins, and Propoxyphene     Medications reviewed:   Current Outpatient Medications   Medication Instructions    albuterol (ProAir HFA) 90 mcg/actuation inhaler 2 puffs, inhalation, Every 4 hours PRN    amLODIPine (NORVASC) 2.5 mg, oral, Daily    aspirin 81 mg EC tablet 1 tablet, oral, Daily    atorvastatin (LIPITOR) 80 mg, oral, Daily    hydroCHLOROthiazide (HYDRODIURIL) 25 mg, oral, Daily    isosorbide mononitrate ER (IMDUR) 60 mg, oral, Daily    metoprolol tartrate (LOPRESSOR) 50 mg, oral, 2 times daily    nitroglycerin (NITROSTAT) 0.3 mg, sublingual, Every 5 min PRN        Vitals reviewed: Visit Vitals  /73 (BP Location: Left arm, Patient Position: Sitting)   Pulse 61       Physical Exam:   /73 (BP Location: Left arm, Patient Position: Sitting)   Pulse 61   Ht 1.803 m (5' 11\")   Wt 81.6 kg (180 lb)   SpO2 93%   BMI 25.10 kg/m²     General:  Patient is awake, alert, and oriented.  Patient is in no acute distress.  HEENT:  Pupils equal and reactive.  Normocephalic.  Moist mucosa.    Neck:  No thyromegaly.  Normal Jugular Venous Pressure.  Cardiovascular:  Regular rate and rhythm.  Normal S1 and S2.  1/6 JANE.  Pulmonary:  Clear to auscultation bilaterally.  Abdomen:  Soft. Non-tender.   Non-distended.  Positive bowel sounds.  Lower Extremities:  2+ pedal pulses. No LE edema.  Neurologic:  Cranial nerves intact.  No focal deficit.   Skin: Skin warm and dry, normal skin turgor.   Psychiatric: Normal affect.    Last Labs:  CBC -      Lab Results   Component Value Date    WBC 8.0 04/30/2024    HGB 16.1 (H) 04/30/2024    HCT 51.6 (H) 04/30/2024     04/30/2024        CMP-  Lab Results   Component Value Date    GLUCOSE 138 (H) 04/30/2024     04/30/2024    K 4.0 04/30/2024     04/30/2024    CO2 31 04/30/2024    ANIONGAP 12 04/30/2024    BUN 16 04/30/2024    CREATININE 0.75 04/30/2024    EGFR 82 04/30/2024    CALCIUM 9.7 04/30/2024    PROT 6.9 04/30/2024    ALBUMIN 4.1 " 04/30/2024    AST 26 04/30/2024    ALT 29 04/30/2024    ALKPHOS 57 04/30/2024    BILITOT 0.9 04/30/2024        LIPIDS-  Lab Results   Component Value Date    CHOL 180 04/30/2024    TRIG 60 04/30/2024    HDL 95.5 04/30/2024    CHHDL 1.9 04/30/2024    VLDL 12 04/30/2024        OTHERS-  Lab Results   Component Value Date    HGBA1C 6.3 (H) 04/30/2024        I personally reviewed the patient's recent vitals, labs, medications, orders, EKGs, pertinent cardiac imaging/ echocardiography and ischemic evaluations including stress testing/ cardiac catheterization.    Echocardiogram 5/2024   1. Left ventricular systolic function is normal with a 65-70% estimated ejection fraction.   2. Spectral Doppler shows an impaired relaxation pattern of left ventricular diastolic filling.   3. There is an elevated mean left atrial pressure.   4. There is low normal right ventricular systolic function.   5. There is moderate mitral annular calcification.   6. Mildly elevated RVSP.   7. Aortic valve sclerosis.   8. Left ventricular cavity size is decreased.    Vascular Studies -- 05/20214  Right Carotid: Findings are consistent with 50 to 69% stenosis of the right proximal internal carotid artery. Laminar flow seen by color Doppler. Right internal carotid artery in the distal segment noted as tortuous. Right external carotid artery appears patent with no evidence of stenosis. No evidence of hemodynamically significant stenosis of the right common carotid artery. The right vertebral artery is patent with antegrade flow. There are elevated velocities in the right subclavian artery that are suggestive of disease.  Left Carotid: Findings are consistent with less than 50% stenosis of the left proximal internal carotid artery. Laminar flow seen by color Doppler. Left external carotid artery not visualized. No evidence of hemodynamically significant stenosis of the left common carotid artery. The left vertebral artery demonstrates bidirectional  flow which may be suggestive of a more proximal stenosis or occlusion. The left subclavian artery demonstrated abnormal waveforms, which may be suggestive of a more proximal stenosis/disease. May wish for further means of evaluation.     Comparison:  Compared with study from 7/2/2020, On today's study, the left vertebral artery demonstrated bidirectional waveforms along with abnormal waveforms in the subclavian both suggestive of a proximal stenosis/occl. The left ECA could not be visualized.     Imaging & Doppler Findings:  Right Plaque Morph: The proximal right internal carotid artery demonstrates heterogenous and calcified plaque. The proximal right external carotid artery demonstrates calcified and heterogenous plaque. The proximal right subclavian artery demonstrates heterogenous plaque.  Left Plaque Morph: The distal left internal carotid artery demonstrates heterogenous plaque. The distal left common carotid artery demonstrates heterogenous plaque.       Echocardiogram 8/2021   1. The left ventricular systolic function is normal with a 60-65% estimated ejection fraction.   2. Spectral Doppler shows an impaired relaxation pattern of left ventricular diastolic filling.   3. There is severe concentric left ventricular hypertrophy.   4. The left atrium is moderately dilated.   5. The left ventricular cavity size is decreased.   6. Trace to mild MR and TR.    Nuclear perfusion stress testing 7/2021  FINDINGS:  Stress and resting images demonstrate a fixed defect in the basal to  mid inferior wall consistent with infarct.     ECG-gated images demonstrate normal LV size with reduction in EF to  48% (as compared to 52% from prior studies)  (normal above 45  percent).     Low-dose CT scan without definite findings of emergent or oncological  disease.     IMPRESSION:  1. Fixed defect in basal to mid inferior wall consistent with infarct.  2. LV EF estimated at 48% which is decreased from prior studies.  3. Normal left  ventricular size.    Assessment and Plan:  Problem List Items Addressed This Visit       Aortic systolic murmur on examination    Coronary artery disease - Primary    Relevant Orders    ECG 12 lead (Clinic Performed) (Completed)    Hypertension    LBBB (left bundle branch block)    Overview     Chronic Left Bundle Branch Block since at least 2017.   -- Continue to monitor.          Occlusion of left carotid artery    Overview     Formatting of this note might be different from the original.   Overview:    Sees  specialist, [place] in 4/2015 unchanged  Formatting of this note might be different from the original.   Sees  specialist, [place] in 4/2015 unchanged         Relevant Orders    Referral to Vascular Surgery    PAD (peripheral artery disease) (CMS-HCC)    Overview     Known Hx of Subclavian stenosis.          Relevant Orders    Referral to Vascular Surgery     She is doing well from a cardiac standpoint.  Her most recent echocardiogram showed stable valvular function.  She has not had any shortness of breath and we are working on getting her to stop smoking.  Given her vascular disease processes including peripheral artery disease with known subclavian stenosis, will refer on to vascular surgery to reestablish care as well as consideration for any impending interventions given flow reversal seen.    Please followup with me in Cardiology clinic within the next 5-7 months  I.  Please return to clinic sooner or seek emergent care if your symptoms reoccur or worsen.    Thank you for allowing me to participate in their care.  Please feel free to call me with any further questions or concerns.        Andres Rizvi DO   Division of Cardiovascular Medicine  [facility], [facility]

## 2024-07-11 NOTE — PROGRESS NOTES
Subjective   Patient ID: gladis Brown is a 79 y.o. female who presents for Knee Pain and Hip Pain.    Knee Pain     Hip Pain          Review of Systems   Constitutional: Negative.    HENT: Negative.     Eyes: Negative.    Respiratory: Negative.     Cardiovascular: Negative.    Gastrointestinal: Negative.    Endocrine: Negative.    Musculoskeletal: Negative.    Skin: Negative.    Allergic/Immunologic: Negative.    Neurological: Negative.    Hematological: Negative.    Psychiatric/Behavioral: Negative.     All other systems reviewed and are negative.      Objective   Pulse 60   Resp 16   Wt 82.1 kg (181 lb)   SpO2 93%   BMI 25.24 kg/m²   Blood pressure 100/65, pulse 60, resp. rate 16, weight 82.1 kg (181 lb), SpO2 93%.   Physical Exam  Vitals and nursing note reviewed.   Constitutional:       Appearance: Normal appearance.   HENT:      Head: Normocephalic and atraumatic.      Right Ear: Tympanic membrane, ear canal and external ear normal.      Left Ear: Tympanic membrane, ear canal and external ear normal. There is no impacted cerumen.      Nose: Nose normal.      Mouth/Throat:      Mouth: Mucous membranes are moist.      Pharynx: Oropharynx is clear.   Eyes:      Extraocular Movements: Extraocular movements intact.      Conjunctiva/sclera: Conjunctivae normal.      Pupils: Pupils are equal, round, and reactive to light.   Cardiovascular:      Rate and Rhythm: Normal rate and regular rhythm.      Pulses: Normal pulses.      Heart sounds: Normal heart sounds. No murmur heard.  Pulmonary:      Effort: Pulmonary effort is normal. No respiratory distress.      Breath sounds: Normal breath sounds. No stridor. No wheezing, rhonchi or rales.   Chest:      Chest wall: No tenderness.   Abdominal:      General: Abdomen is flat. Bowel sounds are normal. There is no distension.      Palpations: Abdomen is soft. There is no mass.      Tenderness: There is no abdominal tenderness. There is no right CVA tenderness, left  CVA tenderness, guarding or rebound.      Hernia: No hernia is present.   Musculoskeletal:         General: Normal range of motion.      Cervical back: Normal range of motion and neck supple.   Skin:     General: Skin is warm.      Capillary Refill: Capillary refill takes less than 2 seconds.   Neurological:      General: No focal deficit present.      Mental Status: She is alert.      Cranial Nerves: No cranial nerve deficit.      Sensory: No sensory deficit.      Motor: No weakness.      Coordination: Coordination normal.      Gait: Gait normal.      Deep Tendon Reflexes: Reflexes normal.   Psychiatric:         Mood and Affect: Mood normal.         Behavior: Behavior normal. Behavior is cooperative.         Thought Content: Thought content normal.         Judgment: Judgment normal.         Assessment/Plan   Problem List Items Addressed This Visit             ICD-10-CM    Controlled diabetes mellitus type II without complication (Multi) E11.9     DM - NIDDM  . Reviewed with patient / Will check  HbA1c and fasting blood sugars. Educate home self monitoring and diary keeping(reviewed with patient home blood sugar levels /diary). Educate extensively low calorie diet and weight loss with exercise. Reviewed BS- diary and Rx. Regimen. [place] renal protection with ARB/ACEI.               Educate compliance with diet and Rx. And educate risks and autcomes.                                                 Hypercholesterolemia E78.00     Hypercholesterolemia - Monitor lipid profile and educate patient upon risks of high cholesterol and targets. Educate diet and change in lifestyle and increase in exercises - Refill:  Atorvastatin   80 mg daily  and educate compliance with medication and diet.           Hypertension - Primary I10     HTN - hypertension well/controlled .Target BP < 130/80 achieved. Educate low salt diet and exercise with weight loss. Educate home self monitoring and diary keeping. Educate risks of elevate  blood pressure and benefits of prompt treatment. Refill hydrochlorothiazide and Amlodipine and and metoprolol          Greater trochanteric bursitis of right hip M70.61     Bursitis - of the Right Trochanteric Bursa. Tenderness to deep palpation of the R/L Trochanter. Recommend NSAIDS (Naprosin 500mg BID vs. Arthrotec 75mg BID), rest and mild exercises .     Injection of 120mg of KENALOG along with 5cc of 2% Lidocaine into the bursa at the tender site. Procedure was tolerated well. Check X - ray of the R/L hip.             Controlled diabetes mellitus type II without complication (Multi) E11.9        DM - NIDDM . Reviewed with patient / Will check HbA1c and fasting blood sugars. Educate home self monitoring and diary keeping(reviewed with patient home blood sugar levels /diary). Educate extensively low calorie diet and weight loss with exercise. Reviewed BS- diary and Rx. Regimen. [place] renal protection with ARB/ACEI. Educate compliance with diet and Rx. And educate risks and autcomes.            Coronary artery disease I25.10       CAD-coronary artery disease-patient has a history of myocardial infarction/. Target LDL should be below 70 milligrams per deciliter.  Follows with her  cardiologist/He needs to call us with any new symptoms of angina or shortness of breath. Last stress test was/last coronary catheterization was/. Need to address risk factors BioCore controlling cholesterol blood pressure and diabetes. Refer to cardiology            Relevant Medications     amLODIPine (Norvasc) 2.5 mg tablet     Other Relevant Orders     Referral to Cardiology     Current smoker F17.200       Tobacco cessation - Educate risks of smoking (CAD/COPD/CANCER of the lung or urinary bladder/CVA). Educate life style changes and prescribe nicotine patch and Wellbutrin 150mg BID. Educate stress reduction.                                                                                        Hypercholesterolemia E78.00        Hypercholesterolemia - Monitor lipid profile and educate patient upon risks of high cholesterol and targets. Educate diet and change in lifestyle and increase in exercises - Refill:  Atorvastatin   80 mg daily  and educate compliance with medication and diet.             Relevant Medications     atorvastatin (Lipitor) 80 mg tablet     Hypertension I10       HTN - hypertension well/controlled .Target BP < 130/80  achieved. Educate low salt diet and exercise with weight loss. Educate home self monitoring and diary keeping. Educate risks of elevate blood pressure and benefits of prompt treatment.  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         Get CXR/CT for lung nodules.  Continues mild exercises and inhalers.  [place] preventive care and vaccinations.                                       Add advair inhalers and spiriva inhaler.             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 Educate exercises and referred patient to Physical Therapy (PT). Ordered X-Ray's of the LS/C spine. Consider MRI. Radiculopathy in the distribution of L4-L5-S1/C3-C4-C5 nerve roots, needs NSAIDS (Naprosin 500mg BID vs. Arthrotec 75mg BID or Prednisone taper (Medrol dose pack), Flexeril 10 mg qHS, heat application, and pain control with Tylenol vs. Vicodin 5/325 mg TID.             Controlled diabetes mellitus type II without complication (Multi) E11.9        DM - NIDDM  . Reviewed with patient / Will check  HbA1c and fasting blood sugars. Educate home self monitoring and diary keeping(reviewed with patient home blood sugar levels /diary). Educate extensively low calorie diet and weight loss with exercise. Reviewed BS- diary and Rx. Regimen. [place] renal protection with ARB/ACEI.               Educate compliance with diet and Rx. And educate risks and autcomes.                                                   Coronary artery disease - Primary I25.10        CAD-coronary artery disease-patient has a history of myocardial infarction/stent placed in stenosed coronary arteries. Target LDL should be below 70 milligrams per deciliter.  Follows with her  cardiologist/He needs to call us with any new symptoms of angina or shortness of breath. Last stress test was/last coronary catheterization was/. Need to address risk factors BioCore controlling cholesterol blood pressure and diabetes. Educate extensively diet. Patient needs to follow in rehabilitation/mild exercises daily.            Relevant Medications      amLODIPine (Norvasc) 2.5 mg tablet      nitroglycerin (Nitrostat) 0.3 mg SL tablet      Hypertension I10        HTN - hypertension well/controlled .Target BP < 130/80  achieved. Educate low salt diet and exercise with weight loss. Educate home self monitoring and diary keeping. Educate risks of elevate blood pressure and benefits of prompt  treatment.  Refill hydrochlorothiazide and Amlodipine and and metoprolol            PAD (peripheral artery disease) (CMS-HCC) I73.9        Recommend tobacco cessation and Tobacco cessation - Educate risks of smoking (CAD/COPD/CANCER of the lung or urinary bladder/CVA). Educate life style changes and prescribe nicotine patch and Wellbutrin 150mg BID. Educate stress reduction.        And get a PVR                                                                                 Carotid stenosis I65.29      Nicotine dependence (Chronic) F17.200        Tobacco cessation - Educate risks of smoking (CAD/COPD/CANCER of the lung or urinary bladder/CVA). Educate life style changes and prescribe nicotine patch and Wellbutrin 150mg BID. Educate stress reduction.                                                                                        Occlusion of left carotid artery I65.22        Monitor US of the Carotid arteries  and Tobacco cessation - Educate risks of smoking (CAD/COPD/CANCER of the lung or urinary bladder/CVA). Educate life style changes and prescribe nicotine patch and Wellbutrin 150mg BID. Educate stress reduction.                                                                                        Panlobular emphysema (Multi) J43.1        COPD - Educate tobacco cessation extensively , no wheezing, no SOB, no Crackles heard/ Exacerbation.         Get CXR/CT for lung nodules.  Continues mild exercises and inhalers.  [place] preventive care and vaccinations.                                       Add advair inhalers and spiriva inhaler.                  Total face to face time was 60 minutes , with more than 50% spent counseling the patient regarding diagnosis of Hypertension with aggressive treatment of hypercholesterolemia. Educate extensively diet and lifestyle changes and increase in physical activity and weight loss. Also educate the patient the risks of  untreated hypertension or untreated  hypercholesterolemia  - educate risks of developing coronary artery disease , stroke or renal failure -                Immunizations/Injections       very important  Immunizations from outside sources need reconciliation.       Influenza, High Dose Seasonal, Preservative Free9/24/2020, 9/27/2018, 8/24/2017  Influenza, [place]/18/2016, 9/29/2015, 12/9/2014,  ... (2 more)  Mumps2/22/2012  Pfizer COVID-19 vaccine, Fall 2023, 12 years and older, (30mcg/0.3mL)3/18/2024  Pfizer Purple Cap SARS-CoV-210/25/2021, 4/5/2021, 3/8/2021  Pneumococcal conjugate vaccine, 13-valent (PREVNAR 13)6/21/2018

## 2024-07-11 NOTE — PATIENT INSTRUCTIONS
"It was a pleasure seeing you today. I would like to see you back in clinic in  5-7  months. You can call my office if questions arise between now and our next visit.     Today, we talked about your blockages in the shoulder vessels as well as your heart valvces     -- I want you to see Dr Jasso at [facility] ([address] [place] [address] ) to discuss the blockages in the shoulder vessels and the neck vessels     -- We ill continue to work on your heart risks   ++ the Valves of the heart are starting to get \"gunked up\" and at sometime, you may need to have these fixed. It wont be for a while however.   ++ instead, we need to work on addressing the risk factors that cause heart issues   += Stay on yor cholesterol medication and your blood pressure medications for now     STOP SMOKING   Please stop smoking.    --Try to cut back on the number of cigarettes that you smoke.    -- Try to increase the interval between cigarettes.   -- Try to use substitutes such as sugar-free candy carrots,celery sticks as in between.  --  Once you are down to less than half a pack a day try to go cold turkey.   -- Try to designate areas in the your home as smoke-free areas.   -- Try to reduce the number of ashtrays and other reminders of smoking.   -- Try to keep any major something that you dislike next to your cigarette pack to try to develop a mental aversion to smoking      Below are some Heart Health Tips that we provide to all of our patients. I hope you fing them useful.     - If you are having problems with medications, consider looking at the following websites.   --  \"GoodRx\"   --  \"[facility]\"      - We are happy to supply written prescriptions if needed to allow you to obtain your medications from different pharmacies. Additionally, if you are having issues with mail order delivery, please let us know. We can send a limited supply of your medications to your local pharmacy.     -  We recommend you " follow a heart healthy diet. Watch food labels and try not to eat more than 2,500 mg of sodium per day. Avoid foods high in salt like processed meats (lunch meats, cornelius, and sausage), processed foods (boxed dinners, canned soups), fried and fast foods. Monitor serving sizes and if the sodium per serving size is more than 200 mg, avoid those foods. If the sodium per serving size is between 100-200 mg, you can use those in limited quantities. Try to choose foods where the amount of sodium per serving size is less than 100 mg. Try to eat a diet rich in fruits and vegetables, whole grains, low fat dairy products, skinless poultry and fish, nuts, beans, non-tropical vegetable oils. Limit saturated fat, trans fat, sodium, red meats, and sugar-sweetened beverages.   Limit alcohol     -The combination of a reduced-calorie diet and increased physical activity is recommended. Adults should aim to get at least 150 minutes of moderate physical activity per week (30 minutes of moderate physical activities at least 5 days per week). Examples of moderate physical activities include brisk walking, swimming, aerobic dancing, heavy gardening, jumping rope, bicycling 10 MPH or faster, tennis, hiking uphill or with a heavy backpack. Please let us know if you would like to learn more about your nutrition and calories and additional options including weight loss programs to help you reach your goal.     -If you smoke, stop smoking. If you stop smoking you can help get rid of a major source of stress to your heart. Smoking makes your heart rate and blood pressure go up and increases your risk or developing cardiovascular diseases and worsen symptoms associated with heart failure.     -Obtain a BP monitor and monitor your BP daily. Check it around the same time each day; at least 1 hour after taking your medications. Record your BP in a log and bring your log with you to your doctors appointment.     -F/u with your PCP as recommended.

## 2024-07-11 NOTE — ASSESSMENT & PLAN NOTE
Bursitis - of the Right Trochanteric Bursa. Tenderness to deep palpation of the R/L Trochanter. Recommend NSAIDS (Naprosin 500mg BID vs. Arthrotec 75mg BID), rest and mild exercises .     Injection of 120mg of KENALOG along with 5cc of 2% Lidocaine into the bursa at the tender site. Procedure was tolerated well. Check X - ray of the R/L hip.

## 2024-07-17 LAB
ATRIAL RATE: 61 BPM
P AXIS: 61 DEGREES
P OFFSET: 207 MS
P ONSET: 154 MS
PR INTERVAL: 120 MS
Q ONSET: 214 MS
QRS COUNT: 10 BEATS
QRS DURATION: 134 MS
QT INTERVAL: 488 MS
QTC CALCULATION(BAZETT): 491 MS
QTC FREDERICIA: 490 MS
R AXIS: 18 DEGREES
T AXIS: 178 DEGREES
T OFFSET: 458 MS
VENTRICULAR RATE: 61 BPM

## 2024-08-28 ENCOUNTER — OFFICE VISIT (OUTPATIENT)
Dept: VASCULAR SURGERY | Facility: HOSPITAL | Age: 79
End: 2024-08-28
Payer: COMMERCIAL

## 2024-08-28 VITALS
RESPIRATION RATE: 18 BRPM | DIASTOLIC BLOOD PRESSURE: 69 MMHG | HEART RATE: 57 BPM | SYSTOLIC BLOOD PRESSURE: 117 MMHG | HEIGHT: 71 IN | BODY MASS INDEX: 24.58 KG/M2 | WEIGHT: 175.6 LBS | OXYGEN SATURATION: 95 %

## 2024-08-28 DIAGNOSIS — I65.22 OCCLUSION OF LEFT CAROTID ARTERY: ICD-10-CM

## 2024-08-28 DIAGNOSIS — I65.23 CAROTID STENOSIS, ASYMPTOMATIC, BILATERAL: Primary | ICD-10-CM

## 2024-08-28 DIAGNOSIS — I73.9 PAD (PERIPHERAL ARTERY DISEASE) (CMS-HCC): ICD-10-CM

## 2024-08-28 PROCEDURE — 3078F DIAST BP <80 MM HG: CPT | Performed by: SURGERY

## 2024-08-28 PROCEDURE — 99204 OFFICE O/P NEW MOD 45 MIN: CPT | Performed by: SURGERY

## 2024-08-28 PROCEDURE — 1159F MED LIST DOCD IN RCRD: CPT | Performed by: SURGERY

## 2024-08-28 PROCEDURE — 99214 OFFICE O/P EST MOD 30 MIN: CPT | Performed by: SURGERY

## 2024-08-28 PROCEDURE — 1125F AMNT PAIN NOTED PAIN PRSNT: CPT | Performed by: SURGERY

## 2024-08-28 PROCEDURE — 3074F SYST BP LT 130 MM HG: CPT | Performed by: SURGERY

## 2024-08-28 ASSESSMENT — ENCOUNTER SYMPTOMS
UNEXPECTED WEIGHT CHANGE: 0
OCCASIONAL FEELINGS OF UNSTEADINESS: 1
SHORTNESS OF BREATH: 0
ABDOMINAL DISTENTION: 0
WOUND: 0
WEAKNESS: 0
NUMBNESS: 0
DEPRESSION: 0
LOSS OF SENSATION IN FEET: 0

## 2024-08-28 ASSESSMENT — PAIN SCALES - GENERAL: PAINLEVEL: 4

## 2024-08-28 NOTE — PROGRESS NOTES
Vascular Surgery Consult/Clinic Note    CC: Carotid stenosis.     HPI:  Briana Brown is 79 y.o. female with history of DM, CAD, HTN who was referred for carotid stenosis.   Patient denies any stroke, TIA or unilateral neurologic symptoms.   Patient denies any LE or LUE claudication symptoms. No rest pain.       Meds:   Current Outpatient Medications on File Prior to Visit   Medication Sig Dispense Refill    albuterol (ProAir HFA) 90 mcg/actuation inhaler Inhale 2 puffs every 4 hours if needed for wheezing or shortness of breath. 18 g 2    amLODIPine (Norvasc) 2.5 mg tablet Take 1 tablet (2.5 mg) by mouth once daily. 90 tablet 0    aspirin 81 mg EC tablet Take 1 tablet (81 mg) by mouth once daily.      atorvastatin (Lipitor) 80 mg tablet Take 1 tablet (80 mg) by mouth once daily. 90 tablet 0    hydroCHLOROthiazide (HYDRODiuril) 25 mg tablet Take 1 tablet (25 mg) by mouth once daily. 90 tablet 0    isosorbide mononitrate ER (Imdur) 60 mg 24 hr tablet TAKE 1 TABLET DAILY 90 tablet 0    metoprolol tartrate (Lopressor) 50 mg tablet Take 1 tablet by mouth 2 times a day. 180 tablet 0    nitroglycerin (Nitrostat) 0.3 mg SL tablet Place 1 tablet (0.3 mg) under the tongue every 5 minutes if needed for chest pain.       No current facility-administered medications on file prior to visit.        Allergies:   Allergies   Allergen Reactions    Kiwi Swelling    Lisinopril Swelling    Penicillins Hives and Rash     Other reaction(s): Other (See Comments)    Propoxyphene Other, Drowsiness and Rash     Other reaction(s): Other (See Comments)   Drowsiness, incoherent, difficulty awakening, tachycardia, Drowsiness, incoherent, difficulty awakening, tachycardia    Drowsiness, incoherent, difficulty awakening, tachycardia, Drowsiness, incoherent, difficulty awakening, tachycardia       SH:    Social Determinants of Health     Tobacco Use: High Risk (8/28/2024)    Patient History     Smoking Tobacco Use: Every Day     Smokeless  Tobacco Use: Never     Passive Exposure: Current   Alcohol Use: Not on file   Financial Resource Strain: Not on file   Food Insecurity: Not on file   Transportation Needs: Not on file   Physical Activity: Not on file   Stress: Not on file   Social Connections: Not on file   Intimate Partner Violence: Not on file   Depression: Not at risk (7/11/2024)    PHQ-2     PHQ-2 Score: 0   Housing Stability: Not on file   Utilities: Not on file   Digital Equity: Not on file   Health Literacy: Not on file        FH:  No family history on file.     ROS:  Review of Systems   Constitutional:  Negative for unexpected weight change.   HENT:  Negative for congestion.    Eyes:  Negative for visual disturbance.   Respiratory:  Negative for shortness of breath.    Cardiovascular:  Negative for chest pain.   Gastrointestinal:  Negative for abdominal distention.   Skin:  Negative for wound.   Neurological:  Negative for weakness and numbness.        Objective:  Vitals:  Vitals:    08/28/24 1259   BP: 84/58   Pulse: 57   Resp: 18   SpO2: 95%        Exam:  Gen: in NAD  GI: Soft, ND/NT  Ext:  R radial with palpable pulse. L radial with doppler signal.   BUE and BLE with 5/5 motor str.   BLE without any tissue loss.     Imaging:  Carotid duplex (5/2/2024) independently reviewed.   R ICA with 50-69% stenosis.   L ICA patent without flow limiting stenosis.   R vert with antegrade flow.   L vert with bidirectional flow.     Assessment & Plan:  Briana Brown is 79 y.o. female with history of Asx. R ICA stenosis, and likely LSA stenois (Asx).    - Cont. ASA and statin therapy.    - Follow up in 1 year with carotid duplex.     Cain Jasso MD, PhD  Clinical   [facility] [employer]  Co-Director, Aortic Center  [facility]

## 2024-09-30 DIAGNOSIS — E78.00 HYPERCHOLESTEROLEMIA: ICD-10-CM

## 2024-09-30 DIAGNOSIS — I10 PRIMARY HYPERTENSION: ICD-10-CM

## 2024-09-30 DIAGNOSIS — I10 HYPERTENSION, UNSPECIFIED TYPE: ICD-10-CM

## 2024-09-30 RX ORDER — ATORVASTATIN CALCIUM 80 MG/1
80 TABLET, FILM COATED ORAL DAILY
Qty: 90 TABLET | Refills: 0 | Status: SHIPPED | OUTPATIENT
Start: 2024-09-30

## 2024-09-30 RX ORDER — ISOSORBIDE MONONITRATE 60 MG/1
60 TABLET, EXTENDED RELEASE ORAL DAILY
Qty: 90 TABLET | Refills: 0 | Status: SHIPPED | OUTPATIENT
Start: 2024-09-30

## 2024-09-30 RX ORDER — AMLODIPINE BESYLATE 2.5 MG/1
2.5 TABLET ORAL DAILY
Qty: 90 TABLET | Refills: 0 | Status: SHIPPED | OUTPATIENT
Start: 2024-09-30

## 2024-09-30 RX ORDER — HYDROCHLOROTHIAZIDE 25 MG/1
25 TABLET ORAL DAILY
Qty: 90 TABLET | Refills: 0 | Status: SHIPPED | OUTPATIENT
Start: 2024-09-30

## 2024-12-17 ENCOUNTER — OFFICE VISIT (OUTPATIENT)
Dept: CARDIOLOGY | Facility: CLINIC | Age: 79
End: 2024-12-17
Payer: COMMERCIAL

## 2024-12-17 VITALS
OXYGEN SATURATION: 93 % | BODY MASS INDEX: 23.98 KG/M2 | RESPIRATION RATE: 18 BRPM | WEIGHT: 177 LBS | SYSTOLIC BLOOD PRESSURE: 173 MMHG | HEIGHT: 72 IN | DIASTOLIC BLOOD PRESSURE: 83 MMHG | HEART RATE: 55 BPM

## 2024-12-17 DIAGNOSIS — E78.00 HYPERCHOLESTEROLEMIA: ICD-10-CM

## 2024-12-17 DIAGNOSIS — I35.8 AORTIC SYSTOLIC MURMUR ON EXAMINATION: ICD-10-CM

## 2024-12-17 DIAGNOSIS — I25.118 CORONARY ARTERY DISEASE OF NATIVE HEART WITH STABLE ANGINA PECTORIS, UNSPECIFIED VESSEL OR LESION TYPE: ICD-10-CM

## 2024-12-17 DIAGNOSIS — I10 HYPERTENSION, UNSPECIFIED TYPE: ICD-10-CM

## 2024-12-17 DIAGNOSIS — I10 PRIMARY HYPERTENSION: ICD-10-CM

## 2024-12-17 DIAGNOSIS — F17.219 CIGARETTE NICOTINE DEPENDENCE WITH NICOTINE-INDUCED DISORDER: Chronic | ICD-10-CM

## 2024-12-17 DIAGNOSIS — I44.7 LBBB (LEFT BUNDLE BRANCH BLOCK): Primary | ICD-10-CM

## 2024-12-17 LAB
ATRIAL RATE: 55 BPM
P AXIS: 57 DEGREES
P OFFSET: 208 MS
P ONSET: 153 MS
PR INTERVAL: 124 MS
Q ONSET: 215 MS
QRS COUNT: 9 BEATS
QRS DURATION: 136 MS
QT INTERVAL: 494 MS
QTC CALCULATION(BAZETT): 472 MS
QTC FREDERICIA: 480 MS
R AXIS: 2 DEGREES
T AXIS: 163 DEGREES
T OFFSET: 462 MS
VENTRICULAR RATE: 55 BPM

## 2024-12-17 PROCEDURE — 99214 OFFICE O/P EST MOD 30 MIN: CPT | Mod: 25 | Performed by: INTERNAL MEDICINE

## 2024-12-17 PROCEDURE — 1159F MED LIST DOCD IN RCRD: CPT | Performed by: INTERNAL MEDICINE

## 2024-12-17 PROCEDURE — 3079F DIAST BP 80-89 MM HG: CPT | Performed by: INTERNAL MEDICINE

## 2024-12-17 PROCEDURE — 93010 ELECTROCARDIOGRAM REPORT: CPT | Performed by: INTERNAL MEDICINE

## 2024-12-17 PROCEDURE — 93005 ELECTROCARDIOGRAM TRACING: CPT | Performed by: INTERNAL MEDICINE

## 2024-12-17 PROCEDURE — G2211 COMPLEX E/M VISIT ADD ON: HCPCS | Performed by: INTERNAL MEDICINE

## 2024-12-17 PROCEDURE — 99406 BEHAV CHNG SMOKING 3-10 MIN: CPT | Performed by: INTERNAL MEDICINE

## 2024-12-17 PROCEDURE — 99214 OFFICE O/P EST MOD 30 MIN: CPT | Performed by: INTERNAL MEDICINE

## 2024-12-17 PROCEDURE — 1160F RVW MEDS BY RX/DR IN RCRD: CPT | Performed by: INTERNAL MEDICINE

## 2024-12-17 PROCEDURE — 3077F SYST BP >= 140 MM HG: CPT | Performed by: INTERNAL MEDICINE

## 2024-12-17 RX ORDER — ATORVASTATIN CALCIUM 80 MG/1
80 TABLET, FILM COATED ORAL DAILY
Qty: 90 TABLET | Refills: 3 | Status: SHIPPED | OUTPATIENT
Start: 2024-12-17 | End: 2025-12-17

## 2024-12-17 RX ORDER — AMLODIPINE BESYLATE 10 MG/1
10 TABLET ORAL DAILY
Qty: 90 TABLET | Refills: 3 | Status: SHIPPED | OUTPATIENT
Start: 2024-12-17 | End: 2025-12-17

## 2024-12-17 RX ORDER — ISOSORBIDE MONONITRATE 60 MG/1
60 TABLET, EXTENDED RELEASE ORAL DAILY
Qty: 90 TABLET | Refills: 3 | Status: SHIPPED | OUTPATIENT
Start: 2024-12-17 | End: 2025-12-17

## 2024-12-17 RX ORDER — HYDROCHLOROTHIAZIDE 25 MG/1
25 TABLET ORAL DAILY
Qty: 90 TABLET | Refills: 3 | Status: SHIPPED | OUTPATIENT
Start: 2024-12-17 | End: 2025-12-17

## 2024-12-17 ASSESSMENT — ENCOUNTER SYMPTOMS: DEPRESSION: 0

## 2024-12-17 NOTE — PROGRESS NOTES
"Referred by Dr. Rizvi for Follow-up and Coronary Artery Disease     HPI:    Briana Brown is a 79 y.o. female with pertinent history of Coronary artery disease, aortic stenosis, left bundle branch block on prior EKG, mitral regurgitation, peripheral arterial disease who presents to cardiology clinic to establish care.     She reports that in [id number], she had noticed  bilateral wrist and arm pain. She had been referred to cardiology. EKG and \"Stuff\" were done and a heart catheterization was perfomred. It showed \"Blockages in the back of the heart\" these are medically managed.  Since that time, she has done well and avoided cardiovascular care. She notes that recently emergent cardiac care.  She was last seen by Dr. ROSEANNE Green in 2021.  At that time, it was recommended that she repeat stress testing, echocardiogram and to refrain from smoking.  Her stress test at that time showed a fixed defect in the basal to mid inferior wall consistent with prior infarction and an ejection fraction estimated at 48% by nuclear studies.  Given the patient's symptoms and in order to further evaluate possible arrhythmias, we will plan to perform an event monitor.  Echocardiogram was then obtained which showed ejection fraction close to 6065%, severe concentric LVH with impaired relaxation diastology.  She has noted to have mild aortic stenosis with a mean gradient of 6.4 mm.    12/17/2024 -- She returns for follow up. She ntoes that her blood pressures has been higher than she has been used to seeing She has been using all of her medications appropriately.  She denies any chest heaviness or tightness.  She denies any shortness of breath.  Unfortunately, she has not been able to stop smoking.    No exacerbating or relieving factors.  Patient denies chest pain and angina.  Pt denies orthopnea, and paroxysmal nocturnal dyspnea.  Pt denies worsening lower extremity edema.  Pt denies palpitations or syncope.  No recent falls.  No " fever or chills.  No cough.  No change in bowel or bladder habits.  No sick contacts.  No recent travel.    12 point review of systems including (Constitutional, Eyes, ENMT, Respiratory, Cardiac, Gastrointestinal, Neurological, Psychiatric, and Hematologic) was performed and is otherwise negative.    Past medical history reviewed:   has a past medical history of Atherosclerotic heart disease of native coronary artery without angina pectoris (07/09/2021), Essential (primary) hypertension (12/16/2022), Occlusion and stenosis of left carotid artery, Peripheral vascular disease, unspecified (CMS-HCC) (01/04/2017), Personal history of colonic polyps, Personal history of nicotine dependence, Personal history of other diseases of the circulatory system (04/15/2014), Personal history of other diseases of the circulatory system, Personal history of other diseases of the musculoskeletal system and connective tissue (04/15/2014), Personal history of other diseases of the musculoskeletal system and connective tissue (04/15/2014), Personal history of other diseases of the nervous system and sense organs, Personal history of other diseases of the respiratory system, Personal history of other diseases of the respiratory system (05/17/2017), Personal history of other endocrine, nutritional and metabolic disease, Personal history of other medical treatment, Presbyopia (06/05/2018), Presbyopia (06/05/2018), Pure hypercholesterolemia, unspecified (12/16/2022), Stricture of artery (CMS-HCC), and Type 2 diabetes mellitus without complications (Multi) (07/09/2020).    Past surgical history reviewed:   has a past surgical history that includes Cataract extraction (04/15/2014); Tubal ligation (04/15/2014); Appendectomy (04/15/2014); Other surgical history (06/05/2018); and Other surgical history (06/05/2018).    Social history reviewed:   reports that she has been smoking cigarettes. She started smoking about 61 years ago. She has a 30.5  pack-year smoking history. She has been exposed to tobacco smoke. She has never used smokeless tobacco. She reports current alcohol use of about 8.0 - 10.0 standard drinks of alcohol per week. She reports that she does not use drugs.     Family history reviewed:    Brother had rheumatic heart disease and has a pig valve, mother had rheumatic heart disease  at age 59 post surgery for bypass,     Allergies reviewed: Lisinopril, Kiwi, Penicillins, and Propoxyphene     Medications reviewed:   Current Outpatient Medications   Medication Instructions    albuterol (ProAir HFA) 90 mcg/actuation inhaler 2 puffs, inhalation, Every 4 hours PRN    amLODIPine (NORVASC) 10 mg, oral, Daily    aspirin 81 mg EC tablet 1 tablet, Daily    atorvastatin (LIPITOR) 80 mg, oral, Daily    hydroCHLOROthiazide (HYDRODIURIL) 25 mg, oral, Daily    isosorbide mononitrate ER (IMDUR) 60 mg, oral, Daily    metoprolol tartrate (LOPRESSOR) 50 mg, oral, 2 times daily    nitroglycerin (NITROSTAT) 0.3 mg, Every 5 min PRN        Vitals reviewed: Visit Vitals  /83 (BP Location: Right arm, Patient Position: Sitting, BP Cuff Size: Large adult)   Pulse 55   Resp 18       Physical Exam:   /83 (BP Location: Right arm, Patient Position: Sitting, BP Cuff Size: Large adult)   Pulse 55   Resp 18   Ht 1.829 m (6')   Wt 80.3 kg (177 lb)   SpO2 93%   BMI 24.01 kg/m²     General:  Patient is awake, alert, and oriented.  Patient is in no acute distress.  HEENT:  Pupils equal and reactive.  Normocephalic.  Moist mucosa.    Neck:  No thyromegaly.  Normal Jugular Venous Pressure.  Cardiovascular:  Regular rate and rhythm.  Normal S1 and S2.  1/6 JANE.  Pulmonary:  Clear to auscultation bilaterally.  Abdomen:  Soft. Non-tender.   Non-distended.  Positive bowel sounds.  Lower Extremities:  2+ pedal pulses. No LE edema.  Neurologic:  Cranial nerves intact.  No focal deficit.   Skin: Skin warm and dry, normal skin turgor.   Psychiatric: Normal  affect.    Last Labs:  CBC -      Lab Results   Component Value Date    WBC 8.0 04/30/2024    HGB 16.1 (H) 04/30/2024    HCT 51.6 (H) 04/30/2024     04/30/2024        CMP-  Lab Results   Component Value Date    GLUCOSE 138 (H) 04/30/2024     04/30/2024    K 4.0 04/30/2024     04/30/2024    CO2 31 04/30/2024    ANIONGAP 12 04/30/2024    BUN 16 04/30/2024    CREATININE 0.75 04/30/2024    EGFR 82 04/30/2024    CALCIUM 9.7 04/30/2024    PROT 6.9 04/30/2024    ALBUMIN 4.1 04/30/2024    AST 26 04/30/2024    ALT 29 04/30/2024    ALKPHOS 57 04/30/2024    BILITOT 0.9 04/30/2024        LIPIDS-  Lab Results   Component Value Date    CHOL 180 04/30/2024    TRIG 60 04/30/2024    HDL 95.5 04/30/2024    CHHDL 1.9 04/30/2024    VLDL 12 04/30/2024        OTHERS-  Lab Results   Component Value Date    HGBA1C 6.3 (H) 04/30/2024        I personally reviewed the patient's recent vitals, labs, medications, orders, EKGs, pertinent cardiac imaging/ echocardiography and ischemic evaluations including stress testing/ cardiac catheterization.    Echocardiogram 5/2024   1. Left ventricular systolic function is normal with a 65-70% estimated ejection fraction.   2. Spectral Doppler shows an impaired relaxation pattern of left ventricular diastolic filling.   3. There is an elevated mean left atrial pressure.   4. There is low normal right ventricular systolic function.   5. There is moderate mitral annular calcification.   6. Mildly elevated RVSP.   7. Aortic valve sclerosis.   8. Left ventricular cavity size is decreased.    Vascular Studies -- 05/20214  Right Carotid: Findings are consistent with 50 to 69% stenosis of the right proximal internal carotid artery. Laminar flow seen by color Doppler. Right internal carotid artery in the distal segment noted as tortuous. Right external carotid artery appears patent with no evidence of stenosis. No evidence of hemodynamically significant stenosis of the right common carotid  artery. The right vertebral artery is patent with antegrade flow. There are elevated velocities in the right subclavian artery that are suggestive of disease.  Left Carotid: Findings are consistent with less than 50% stenosis of the left proximal internal carotid artery. Laminar flow seen by color Doppler. Left external carotid artery not visualized. No evidence of hemodynamically significant stenosis of the left common carotid artery. The left vertebral artery demonstrates bidirectional flow which may be suggestive of a more proximal stenosis or occlusion. The left subclavian artery demonstrated abnormal waveforms, which may be suggestive of a more proximal stenosis/disease. May wish for further means of evaluation.     Comparison:  Compared with study from 7/2/2020, On today's study, the left vertebral artery demonstrated bidirectional waveforms along with abnormal waveforms in the subclavian both suggestive of a proximal stenosis/occl. The left ECA could not be visualized.     Imaging & Doppler Findings:  Right Plaque Morph: The proximal right internal carotid artery demonstrates heterogenous and calcified plaque. The proximal right external carotid artery demonstrates calcified and heterogenous plaque. The proximal right subclavian artery demonstrates heterogenous plaque.  Left Plaque Morph: The distal left internal carotid artery demonstrates heterogenous plaque. The distal left common carotid artery demonstrates heterogenous plaque.       Echocardiogram 8/2021   1. The left ventricular systolic function is normal with a 60-65% estimated ejection fraction.   2. Spectral Doppler shows an impaired relaxation pattern of left ventricular diastolic filling.   3. There is severe concentric left ventricular hypertrophy.   4. The left atrium is moderately dilated.   5. The left ventricular cavity size is decreased.   6. Trace to mild MR and TR.    Nuclear perfusion stress testing 7/2021  FINDINGS:  Stress and resting  images demonstrate a fixed defect in the basal to  mid inferior wall consistent with infarct.     ECG-gated images demonstrate normal LV size with reduction in EF to  48% (as compared to 52% from prior studies)  (normal above 45  percent).     Low-dose CT scan without definite findings of emergent or oncological  disease.     IMPRESSION:  1. Fixed defect in basal to mid inferior wall consistent with infarct.  2. LV EF estimated at 48% which is decreased from prior studies.  3. Normal left ventricular size.    Assessment and Plan:  Problem List Items Addressed This Visit       Nicotine dependence (Chronic)    Relevant Orders    Renal function panel    Aortic systolic murmur on examination    Relevant Orders    Lipid Panel    Lipoprotein a    Renal function panel    Coronary artery disease    Relevant Medications    amLODIPine (Norvasc) 10 mg tablet    isosorbide mononitrate ER (Imdur) 60 mg 24 hr tablet    Other Relevant Orders    Lipid Panel    Lipoprotein a    Renal function panel    Hypercholesterolemia    Relevant Medications    atorvastatin (Lipitor) 80 mg tablet    Other Relevant Orders    Lipid Panel    Lipoprotein a    Renal function panel    Hypertension    Relevant Medications    amLODIPine (Norvasc) 10 mg tablet    hydroCHLOROthiazide (HYDRODiuril) 25 mg tablet    isosorbide mononitrate ER (Imdur) 60 mg 24 hr tablet    Other Relevant Orders    Renal function panel    Renal function panel    LBBB (left bundle branch block) - Primary    Overview     Chronic Left Bundle Branch Block since at least 2017.   -- Continue to monitor.          Relevant Orders    ECG 12 lead (Clinic Performed) (Completed)    Renal function panel         She is doing well from a cardiac standpoint.  Her most recent echocardiogram showed stable valvular function.  She has not had any shortness of breath and we are working on getting her to stop smoking.  Her blood pressure has been elevated more so than normal.  We will increase her  amlodipine from 2.5 mg to 10 mg total dosing.  This should improve blood pressure to goal.       Please followup with me in Cardiology clinic within the next 5-7 months  I.  Please return to clinic sooner or seek emergent care if your symptoms reoccur or worsen.    Thank you for allowing me to participate in their care.  Please feel free to call me with any further questions or concerns.        Andres Rizvi DO   Division of Cardiovascular Medicine  [facility] [place], [facility]

## 2024-12-17 NOTE — PATIENT INSTRUCTIONS
"It was a pleasure seeing you today. I would like to see you back in clinic in  3  months. You can call my office if questions arise between now and our next visit.     Today, we talked about your blockages in the shoulder vessels as well as your heart valves     -- We ill continue to work on your heart risks   ++ the Valves of the heart are starting to get \"gunked up\" and at sometime, you may need to have these fixed. It wont be for a while however.   ++ instead, we need to work on addressing the risk factors that cause heart issues      STOP SMOKING   Please stop smoking.    --Try to cut back on the number of cigarettes that you smoke.    -- Try to increase the interval between cigarettes.   -- Try to use substitutes such as sugar-free candy carrots,celery sticks as in between.  --  Once you are down to less than half a pack a day try to go cold turkey.   -- Try to designate areas in the your home as smoke-free areas.   -- Try to reduce the number of ashtrays and other reminders of smoking.   -- Try to keep any major something that you dislike next to your cigarette pack to try to develop a mental aversion to smoking    Blood {pressure -- This is going up.   -- We are going to increase the amlodipine from 2.5 mg --> 10 mg  -- Your remaining medications will not change     Have blood work done about 1-2 weeks before we see each other        Below are some Heart Health Tips that we provide to all of our patients. I hope you fing them useful.     - If you are having problems with medications, consider looking at the following websites.   --  \"GoodRx\"   --  \"[facility]\"      - We are happy to supply written prescriptions if needed to allow you to obtain your medications from different pharmacies. Additionally, if you are having issues with mail order delivery, please let us know. We can send a limited supply of your medications to your local pharmacy.     -  We recommend you follow a heart " healthy diet. Watch food labels and try not to eat more than 2,500 mg of sodium per day. Avoid foods high in salt like processed meats (lunch meats, cornelius, and sausage), processed foods (boxed dinners, canned soups), fried and fast foods. Monitor serving sizes and if the sodium per serving size is more than 200 mg, avoid those foods. If the sodium per serving size is between 100-200 mg, you can use those in limited quantities. Try to choose foods where the amount of sodium per serving size is less than 100 mg. Try to eat a diet rich in fruits and vegetables, whole grains, low fat dairy products, skinless poultry and fish, nuts, beans, non-tropical vegetable oils. Limit saturated fat, trans fat, sodium, red meats, and sugar-sweetened beverages.   Limit alcohol     -The combination of a reduced-calorie diet and increased physical activity is recommended. Adults should aim to get at least 150 minutes of moderate physical activity per week (30 minutes of moderate physical activities at least 5 days per week). Examples of moderate physical activities include brisk walking, swimming, aerobic dancing, heavy gardening, jumping rope, bicycling 10 MPH or faster, tennis, hiking uphill or with a heavy backpack. Please let us know if you would like to learn more about your nutrition and calories and additional options including weight loss programs to help you reach your goal.     -If you smoke, stop smoking. If you stop smoking you can help get rid of a major source of stress to your heart. Smoking makes your heart rate and blood pressure go up and increases your risk or developing cardiovascular diseases and worsen symptoms associated with heart failure.     -Obtain a BP monitor and monitor your BP daily. Check it around the same time each day; at least 1 hour after taking your medications. Record your BP in a log and bring your log with you to your doctors appointment.     -F/u with your PCP as recommended.

## 2025-03-17 ENCOUNTER — APPOINTMENT (OUTPATIENT)
Dept: CARDIOLOGY | Facility: CLINIC | Age: 80
End: 2025-03-17
Payer: COMMERCIAL

## 2025-03-17 ENCOUNTER — ANCILLARY PROCEDURE (OUTPATIENT)
Dept: CARDIOLOGY | Facility: CLINIC | Age: 80
End: 2025-03-17
Payer: COMMERCIAL

## 2025-03-17 VITALS
SYSTOLIC BLOOD PRESSURE: 98 MMHG | RESPIRATION RATE: 18 BRPM | DIASTOLIC BLOOD PRESSURE: 54 MMHG | BODY MASS INDEX: 23.7 KG/M2 | OXYGEN SATURATION: 99 % | WEIGHT: 175 LBS | HEIGHT: 72 IN | HEART RATE: 56 BPM

## 2025-03-17 DIAGNOSIS — I44.7 LBBB (LEFT BUNDLE BRANCH BLOCK): ICD-10-CM

## 2025-03-17 DIAGNOSIS — I20.9 ANGINA PECTORIS: ICD-10-CM

## 2025-03-17 DIAGNOSIS — I10 PRIMARY HYPERTENSION: Primary | ICD-10-CM

## 2025-03-17 DIAGNOSIS — I35.8 AORTIC SYSTOLIC MURMUR ON EXAMINATION: ICD-10-CM

## 2025-03-17 DIAGNOSIS — R00.2 HEART PALPITATIONS: ICD-10-CM

## 2025-03-17 DIAGNOSIS — E78.00 HYPERCHOLESTEROLEMIA: ICD-10-CM

## 2025-03-17 LAB
ATRIAL RATE: 56 BPM
BODY SURFACE AREA: 2.01 M2
P AXIS: 53 DEGREES
P OFFSET: 203 MS
P ONSET: 151 MS
PR INTERVAL: 124 MS
Q ONSET: 213 MS
QRS COUNT: 9 BEATS
QRS DURATION: 140 MS
QT INTERVAL: 496 MS
QTC CALCULATION(BAZETT): 478 MS
QTC FREDERICIA: 485 MS
R AXIS: 6 DEGREES
T AXIS: 166 DEGREES
T OFFSET: 461 MS
VENTRICULAR RATE: 56 BPM

## 2025-03-17 PROCEDURE — 4004F PT TOBACCO SCREEN RCVD TLK: CPT | Performed by: INTERNAL MEDICINE

## 2025-03-17 PROCEDURE — 1159F MED LIST DOCD IN RCRD: CPT | Performed by: INTERNAL MEDICINE

## 2025-03-17 PROCEDURE — 93246 EXT ECG>7D<15D RECORDING: CPT

## 2025-03-17 PROCEDURE — 99214 OFFICE O/P EST MOD 30 MIN: CPT | Performed by: INTERNAL MEDICINE

## 2025-03-17 PROCEDURE — 93005 ELECTROCARDIOGRAM TRACING: CPT | Performed by: INTERNAL MEDICINE

## 2025-03-17 PROCEDURE — 3078F DIAST BP <80 MM HG: CPT | Performed by: INTERNAL MEDICINE

## 2025-03-17 PROCEDURE — 93247 EXT ECG>7D<15D SCAN A/R: CPT

## 2025-03-17 PROCEDURE — 3074F SYST BP LT 130 MM HG: CPT | Performed by: INTERNAL MEDICINE

## 2025-03-17 RX ORDER — AMINOPHYLLINE 25 MG/ML
125 INJECTION, SOLUTION INTRAVENOUS ONCE AS NEEDED
OUTPATIENT
Start: 2025-03-17

## 2025-03-17 RX ORDER — REGADENOSON 0.08 MG/ML
0.4 INJECTION, SOLUTION INTRAVENOUS
OUTPATIENT
Start: 2025-03-17

## 2025-03-17 ASSESSMENT — ENCOUNTER SYMPTOMS: DEPRESSION: 0

## 2025-03-17 NOTE — PROGRESS NOTES
"Referred by Dr. Rizvi for Follow-up and Coronary Artery Disease     HPI:    Briana Brown is a 79 y.o. female with pertinent history of Coronary artery disease, aortic stenosis, left bundle branch block on prior EKG, mitral regurgitation, peripheral arterial disease who presents to cardiology clinic to establish care.     She reports that in [id number], she had noticed  bilateral wrist and arm pain. She had been referred to cardiology. EKG and \"Stuff\" were done and a heart catheterization was perfomred. It showed \"Blockages in the back of the heart\" these are medically managed.  Since that time, she has done well and avoided cardiovascular care. She notes that recently emergent cardiac care.  She was last seen by Dr. ROSEANNE Green in 2021.  At that time, it was recommended that she repeat stress testing, echocardiogram and to refrain from smoking.  Her stress test at that time showed a fixed defect in the basal to mid inferior wall consistent with prior infarction and an ejection fraction estimated at 48% by nuclear studies.  Given the patient's symptoms and in order to further evaluate possible arrhythmias, we will plan to perform an event monitor.  Echocardiogram was then obtained which showed ejection fraction close to 6065%, severe concentric LVH with impaired relaxation diastology.  She has noted to have mild aortic stenosis with a mean gradient of 6.4 mm.    12/17/2024 -- She returns for follow up. She ntoes that her blood pressures has been higher than she has been used to seeing She has been using all of her medications appropriately.  She denies any chest heaviness or tightness.  She denies any shortness of breath.  Unfortunately, she has not been able to stop smoking.    3/17/2025 --> She returns for follow up. She has been watching commercials and notes that she thicnks she may have atrial fibrillation. She has dizziness, tiredness, palpitations.  She also has concerns about chronic menopause. She still " "is having some pain in her arms and shoulders that she attributes to symptoms of her heart. She reports severe myalgia and \"my skin hurting\" and she wonders whether the degree of pain is related     No exacerbating or relieving factors.  Patient denies chest pain and angina.  Pt denies orthopnea, and paroxysmal nocturnal dyspnea.  Pt denies worsening lower extremity edema.  Pt denies palpitations or syncope.  No recent falls.  No fever or chills.  No cough.  No change in bowel or bladder habits.  No sick contacts.  No recent travel.    12 point review of systems including (Constitutional, Eyes, ENMT, Respiratory, Cardiac, Gastrointestinal, Neurological, Psychiatric, and Hematologic) was performed and is otherwise negative.    Past medical history reviewed:   has a past medical history of Atherosclerotic heart disease of native coronary artery without angina pectoris (07/09/2021), Essential (primary) hypertension (12/16/2022), Occlusion and stenosis of left carotid artery, Peripheral vascular disease, unspecified (CMS-HCC) (01/04/2017), Personal history of colonic polyps, Personal history of nicotine dependence, Personal history of other diseases of the circulatory system (04/15/2014), Personal history of other diseases of the circulatory system, Personal history of other diseases of the musculoskeletal system and connective tissue (04/15/2014), Personal history of other diseases of the musculoskeletal system and connective tissue (04/15/2014), Personal history of other diseases of the nervous system and sense organs, Personal history of other diseases of the respiratory system, Personal history of other diseases of the respiratory system (05/17/2017), Personal history of other endocrine, nutritional and metabolic disease, Personal history of other medical treatment, Presbyopia (06/05/2018), Presbyopia (06/05/2018), Pure hypercholesterolemia, unspecified (12/16/2022), Stricture of artery (CMS-[facility]), and Type 2 " diabetes mellitus without complications (Multi) (2020).    Past surgical history reviewed:   has a past surgical history that includes Cataract extraction (04/15/2014); Tubal ligation (04/15/2014); Appendectomy (04/15/2014); Other surgical history (2018); and Other surgical history (2018).    Social history reviewed:   reports that she has been smoking cigarettes. She started smoking about 61 years ago. She has a 30.6 pack-year smoking history. She has been exposed to tobacco smoke. She has never used smokeless tobacco. She reports current alcohol use of about 8.0 - 10.0 standard drinks of alcohol per week. She reports that she does not use drugs.     Family history reviewed:    Brother had rheumatic heart disease and has a pig valve, mother had rheumatic heart disease  at age 59 post surgery for bypass,     Allergies reviewed: Lisinopril, Kiwi, Penicillins, and Propoxyphene     Medications reviewed:   Current Outpatient Medications   Medication Instructions    albuterol (ProAir HFA) 90 mcg/actuation inhaler 2 puffs, inhalation, Every 4 hours PRN    amLODIPine (NORVASC) 10 mg, oral, Daily    aspirin 81 mg EC tablet 1 tablet, Daily    atorvastatin (LIPITOR) 80 mg, oral, Daily    hydroCHLOROthiazide (HYDRODIURIL) 25 mg, oral, Daily    isosorbide mononitrate ER (IMDUR) 60 mg, oral, Daily    metoprolol tartrate (LOPRESSOR) 50 mg, oral, 2 times daily    nitroglycerin (NITROSTAT) 0.3 mg, Every 5 min PRN        Vitals reviewed: Visit Vitals  BP 98/54 (BP Location: Right arm, Patient Position: Sitting, BP Cuff Size: Large adult)   Pulse 56   Resp 18       Physical Exam:   BP 98/54 (BP Location: Right arm, Patient Position: Sitting, BP Cuff Size: Large adult)   Pulse 56   Resp 18   Ht 1.829 m (6')   Wt 79.4 kg (175 lb)   SpO2 99%   BMI 23.73 kg/m²     General:  Patient is awake, alert, and oriented.  Patient is in no acute distress.  HEENT:  Pupils equal and reactive.  Normocephalic.  Moist  mucosa.    Neck:  No thyromegaly.  Normal Jugular Venous Pressure.  Cardiovascular:  Regular rate and rhythm.  Normal S1 and S2.  1/6 JANE.  Pulmonary:  Clear to auscultation bilaterally.  Abdomen:  Soft. Non-tender.   Non-distended.  Positive bowel sounds.  Lower Extremities:  2+ pedal pulses. No LE edema.  Neurologic:  Cranial nerves intact.  No focal deficit.   Skin: Skin warm and dry, normal skin turgor.   Psychiatric: Normal affect.    Last Labs:  CBC -      Lab Results   Component Value Date    WBC 8.0 04/30/2024    HGB 16.1 (H) 04/30/2024    HCT 51.6 (H) 04/30/2024     04/30/2024        CMP-  Lab Results   Component Value Date    GLUCOSE 138 (H) 04/30/2024     04/30/2024    K 4.0 04/30/2024     04/30/2024    CO2 31 04/30/2024    ANIONGAP 12 04/30/2024    BUN 16 04/30/2024    CREATININE 0.75 04/30/2024    EGFR 82 04/30/2024    CALCIUM 9.7 04/30/2024    PROT 6.9 04/30/2024    ALBUMIN 4.1 04/30/2024    AST 26 04/30/2024    ALT 29 04/30/2024    ALKPHOS 57 04/30/2024    BILITOT 0.9 04/30/2024        LIPIDS-  Lab Results   Component Value Date    CHOL 180 04/30/2024    TRIG 60 04/30/2024    HDL 95.5 04/30/2024    CHHDL 1.9 04/30/2024    VLDL 12 04/30/2024        OTHERS-  Lab Results   Component Value Date    HGBA1C 6.3 (H) 04/30/2024        I personally reviewed the patient's recent vitals, labs, medications, orders, EKGs, pertinent cardiac imaging/ echocardiography and ischemic evaluations including stress testing/ cardiac catheterization.    Echocardiogram 5/2024   1. Left ventricular systolic function is normal with a 65-70% estimated ejection fraction.   2. Spectral Doppler shows an impaired relaxation pattern of left ventricular diastolic filling.   3. There is an elevated mean left atrial pressure.   4. There is low normal right ventricular systolic function.   5. There is moderate mitral annular calcification.   6. Mildly elevated RVSP.   7. Aortic valve sclerosis.   8. Left ventricular  cavity size is decreased.    Vascular Studies -- 05/20214  Right Carotid: Findings are consistent with 50 to 69% stenosis of the right proximal internal carotid artery. Laminar flow seen by color Doppler. Right internal carotid artery in the distal segment noted as tortuous. Right external carotid artery appears patent with no evidence of stenosis. No evidence of hemodynamically significant stenosis of the right common carotid artery. The right vertebral artery is patent with antegrade flow. There are elevated velocities in the right subclavian artery that are suggestive of disease.  Left Carotid: Findings are consistent with less than 50% stenosis of the left proximal internal carotid artery. Laminar flow seen by color Doppler. Left external carotid artery not visualized. No evidence of hemodynamically significant stenosis of the left common carotid artery. The left vertebral artery demonstrates bidirectional flow which may be suggestive of a more proximal stenosis or occlusion. The left subclavian artery demonstrated abnormal waveforms, which may be suggestive of a more proximal stenosis/disease. May wish for further means of evaluation.     Comparison:  Compared with study from 7/2/2020, On today's study, the left vertebral artery demonstrated bidirectional waveforms along with abnormal waveforms in the subclavian both suggestive of a proximal stenosis/occl. The left ECA could not be visualized.     Imaging & Doppler Findings:  Right Plaque Morph: The proximal right internal carotid artery demonstrates heterogenous and calcified plaque. The proximal right external carotid artery demonstrates calcified and heterogenous plaque. The proximal right subclavian artery demonstrates heterogenous plaque.  Left Plaque Morph: The distal left internal carotid artery demonstrates heterogenous plaque. The distal left common carotid artery demonstrates heterogenous plaque.       Echocardiogram 8/2021   1. The left ventricular  systolic function is normal with a 60-65% estimated ejection fraction.   2. Spectral Doppler shows an impaired relaxation pattern of left ventricular diastolic filling.   3. There is severe concentric left ventricular hypertrophy.   4. The left atrium is moderately dilated.   5. The left ventricular cavity size is decreased.   6. Trace to mild MR and TR.    Nuclear perfusion stress testing 7/2021  FINDINGS:  Stress and resting images demonstrate a fixed defect in the basal to  mid inferior wall consistent with infarct.     ECG-gated images demonstrate normal LV size with reduction in EF to  48% (as compared to 52% from prior studies)  (normal above 45  percent).     Low-dose CT scan without definite findings of emergent or oncological  disease.     IMPRESSION:  1. Fixed defect in basal to mid inferior wall consistent with infarct.  2. LV EF estimated at 48% which is decreased from prior studies.  3. Normal left ventricular size.    Assessment and Plan:  Problem List Items Addressed This Visit       Angina pectoris    Overview     An increase in symptoms that could be related to angina especially in light of her known coronary artery disease with inferior wall infarction.  --Certainly, this could also be related to intracavitary gradients causing outflow obstruction         Current Assessment & Plan     Given the patient's risk factors and symptoms, we will obtain a stress test for further ischemic evaluation.           Relevant Orders    Cardiology Interpretation Of Nuclear Stress - See Other Report For Nuclear Portion    Nuclear Stress Test    Aortic systolic murmur on examination    Overview     Echocardiogram 05/2024 showed a mild mid cavity left ventricular obstruction noted with the Valsalva maneuver. The resting gradient is 10 mmHg.The provoked gradient is 48 mmHg. She also has mild elevation of her aortic gradients.          Hypercholesterolemia    Overview     The 10-year ASCVD risk score (Juan DK, et al.,  "2019) is: 80.3%    Values used to calculate the score:      Age: 79 years      Sex: Female      Is Non- : Yes      Diabetic: Yes      Tobacco smoker: Yes      Systolic Blood Pressure: 168 mmHg      Is BP treated: Yes      HDL Cholesterol: 95.5 mg/dL      Total Cholesterol: 180 mg/dL    Lab Results   Component Value Date    CHOL 180 04/30/2024    CHOL 165 08/02/2022    CHOL 154 05/27/2021     Lab Results   Component Value Date    HDL 95.5 04/30/2024    HDL 61.6 08/02/2022    HDL 65.4 05/27/2021     Lab Results   Component Value Date    LDLCALC 73 04/30/2024     Lab Results   Component Value Date    TRIG 60 04/30/2024    TRIG 69 08/02/2022     No components found for: \"CHOLHDL\"           Hypertension - Primary    Relevant Orders    ECG 12 lead (Clinic Performed) (Completed)    LBBB (left bundle branch block)    Overview     Chronic Left Bundle Branch Block since at least 2017.   -- Continue to monitor.          Relevant Orders    Holter Or Event Cardiac Monitor    Cardiology Interpretation Of Nuclear Stress - See Other Report For Nuclear Portion    Nuclear Stress Test     Other Visit Diagnoses       Heart palpitations        Relevant Orders    Holter Or Event Cardiac Monitor              She is doing well from a cardiac standpoint.  Her most recent echocardiogram showed stable valvular function.  She has not had any shortness of breath and we are working on getting her to stop smoking.  Her blood pressure has been elevated more so than normal.  We will increase her amlodipine from 2.5 mg to 10 mg total dosing.  This should improve blood pressure to goal.       Please followup with me in Cardiology clinic within 1 to 2 weeks after completion of testing   please return to clinic sooner or seek emergent care if your symptoms reoccur or worsen.    Thank you for allowing me to participate in their care.  Please feel free to call me with any further questions or concerns.        Andres Rizvi DO "   Division of Cardiovascular Medicine  [facility] [place], [facility]

## 2025-03-17 NOTE — PATIENT INSTRUCTIONS
"It was a pleasure seeing you today. I would like to see you back in clinic about 1 to 2 weeks after completion of testing.  You can call my office if questions arise between now and our next visit.     Today, we talked about your blockages in the shoulder vessels as well as your heart valves     -- We ill continue to work on your heart risks     -- Given the patient's symptoms and in order to further evaluate possible arrhythmias, we will plan to perform an event monitor.    -- Given the patient's risk factors and symptoms, we will obtain a nuclear pharmacologic stress test for further ischemic evaluation. Patient should avoid caffeine the day prior to and the day of the stress test. Patient should be without food/ NPO the morning of the stress test or 12 hours prior.        STOP SMOKING   Please stop smoking.    --Try to cut back on the number of cigarettes that you smoke.    -- Try to increase the interval between cigarettes.   -- Try to use substitutes such as sugar-free candy carrots,celery sticks as in between.  --  Once you are down to less than half a pack a day try to go cold turkey.   -- Try to designate areas in the your home as smoke-free areas.   -- Try to reduce the number of ashtrays and other reminders of smoking.   -- Try to keep any major something that you dislike next to your cigarette pack to try to develop a mental aversion to smoking    Blood {pressure -- This is going up.   -- We are going to increase the amlodipine 10 mg  -- Your remaining medications will not change     Have blood work done about 1-2 weeks before we see each other        Below are some Heart Health Tips that we provide to all of our patients. I hope you fing them useful.     - If you are having problems with medications, consider looking at the following websites.   --  \"GoodRx\"   --  \"[facility]\"      - We are happy to supply written prescriptions if needed to allow you to obtain your medications from " different pharmacies. Additionally, if you are having issues with mail order delivery, please let us know. We can send a limited supply of your medications to your local pharmacy.     -  We recommend you follow a heart healthy diet. Watch food labels and try not to eat more than 2,500 mg of sodium per day. Avoid foods high in salt like processed meats (lunch meats, cornelius, and sausage), processed foods (boxed dinners, canned soups), fried and fast foods. Monitor serving sizes and if the sodium per serving size is more than 200 mg, avoid those foods. If the sodium per serving size is between 100-200 mg, you can use those in limited quantities. Try to choose foods where the amount of sodium per serving size is less than 100 mg. Try to eat a diet rich in fruits and vegetables, whole grains, low fat dairy products, skinless poultry and fish, nuts, beans, non-tropical vegetable oils. Limit saturated fat, trans fat, sodium, red meats, and sugar-sweetened beverages.   Limit alcohol     -The combination of a reduced-calorie diet and increased physical activity is recommended. Adults should aim to get at least 150 minutes of moderate physical activity per week (30 minutes of moderate physical activities at least 5 days per week). Examples of moderate physical activities include brisk walking, swimming, aerobic dancing, heavy gardening, jumping rope, bicycling 10 MPH or faster, tennis, hiking uphill or with a heavy backpack. Please let us know if you would like to learn more about your nutrition and calories and additional options including weight loss programs to help you reach your goal.     -If you smoke, stop smoking. If you stop smoking you can help get rid of a major source of stress to your heart. Smoking makes your heart rate and blood pressure go up and increases your risk or developing cardiovascular diseases and worsen symptoms associated with heart failure.     -Obtain a BP monitor and monitor your BP daily. Check  it around the same time each day; at least 1 hour after taking your medications. Record your BP in a log and bring your log with you to your doctors appointment.     -F/u with your PCP as recommended.

## 2025-03-17 NOTE — ASSESSMENT & PLAN NOTE
Given the patient's risk factors and symptoms, we will obtain a stress test for further ischemic evaluation.

## 2025-03-25 ENCOUNTER — PATIENT OUTREACH (OUTPATIENT)
Dept: CARE COORDINATION | Age: 80
End: 2025-03-25
Payer: COMMERCIAL

## 2025-03-28 ENCOUNTER — PATIENT OUTREACH (OUTPATIENT)
Dept: CARE COORDINATION | Age: 80
End: 2025-03-28
Payer: COMMERCIAL

## 2025-03-31 ENCOUNTER — PATIENT OUTREACH (OUTPATIENT)
Dept: CARE COORDINATION | Age: 80
End: 2025-03-31
Payer: COMMERCIAL

## 2025-03-31 NOTE — PROGRESS NOTES
MMO/RPM Referral: Spoke with patient regarding RPM, patient declined enrollment at this time, would like follow up call in May.

## 2025-04-08 LAB — BODY SURFACE AREA: 2.01 M2

## 2025-04-08 PROCEDURE — 93248 EXT ECG>7D<15D REV&INTERPJ: CPT | Performed by: INTERNAL MEDICINE

## 2025-04-10 ENCOUNTER — HOSPITAL ENCOUNTER (OUTPATIENT)
Dept: RADIOLOGY | Facility: HOSPITAL | Age: 80
Discharge: HOME | End: 2025-04-10
Payer: COMMERCIAL

## 2025-04-10 ENCOUNTER — HOSPITAL ENCOUNTER (OUTPATIENT)
Dept: CARDIOLOGY | Facility: HOSPITAL | Age: 80
Discharge: HOME | End: 2025-04-10
Payer: COMMERCIAL

## 2025-04-10 DIAGNOSIS — I20.9 ANGINA PECTORIS: ICD-10-CM

## 2025-04-10 DIAGNOSIS — I44.7 LBBB (LEFT BUNDLE BRANCH BLOCK): ICD-10-CM

## 2025-04-10 PROCEDURE — 78452 HT MUSCLE IMAGE SPECT MULT: CPT

## 2025-04-10 PROCEDURE — A9502 TC99M TETROFOSMIN: HCPCS | Performed by: INTERNAL MEDICINE

## 2025-04-10 PROCEDURE — 93017 CV STRESS TEST TRACING ONLY: CPT

## 2025-04-10 PROCEDURE — 93018 CV STRESS TEST I&R ONLY: CPT | Performed by: INTERNAL MEDICINE

## 2025-04-10 PROCEDURE — 2500000004 HC RX 250 GENERAL PHARMACY W/ HCPCS (ALT 636 FOR OP/ED): Performed by: INTERNAL MEDICINE

## 2025-04-10 PROCEDURE — 3430000001 HC RX 343 DIAGNOSTIC RADIOPHARMACEUTICALS: Performed by: INTERNAL MEDICINE

## 2025-04-10 PROCEDURE — 93016 CV STRESS TEST SUPVJ ONLY: CPT | Performed by: INTERNAL MEDICINE

## 2025-04-10 RX ORDER — REGADENOSON 0.08 MG/ML
0.4 INJECTION, SOLUTION INTRAVENOUS
Status: COMPLETED | OUTPATIENT
Start: 2025-04-10 | End: 2025-04-10

## 2025-04-10 RX ADMIN — TETROFOSMIN 34.5 MILLICURIE: 0.23 INJECTION, POWDER, LYOPHILIZED, FOR SOLUTION INTRAVENOUS at 10:30

## 2025-04-10 RX ADMIN — TETROFOSMIN 10.3 MILLICURIE: 0.23 INJECTION, POWDER, LYOPHILIZED, FOR SOLUTION INTRAVENOUS at 09:27

## 2025-04-10 RX ADMIN — REGADENOSON 0.4 MG: 0.08 INJECTION, SOLUTION INTRAVENOUS at 10:30

## 2025-04-23 LAB
ATRIAL RATE: 56 BPM
P AXIS: 53 DEGREES
P OFFSET: 203 MS
P ONSET: 151 MS
PR INTERVAL: 124 MS
Q ONSET: 213 MS
QRS COUNT: 9 BEATS
QRS DURATION: 140 MS
QT INTERVAL: 496 MS
QTC CALCULATION(BAZETT): 478 MS
QTC FREDERICIA: 485 MS
R AXIS: 6 DEGREES
T AXIS: 166 DEGREES
T OFFSET: 461 MS
VENTRICULAR RATE: 56 BPM

## 2025-08-06 ENCOUNTER — APPOINTMENT (OUTPATIENT)
Dept: CARDIOLOGY | Facility: HOSPITAL | Age: 80
End: 2025-08-06
Payer: COMMERCIAL

## 2025-08-06 ENCOUNTER — HOSPITAL ENCOUNTER (EMERGENCY)
Facility: HOSPITAL | Age: 80
Discharge: HOME | End: 2025-08-06
Attending: EMERGENCY MEDICINE
Payer: COMMERCIAL

## 2025-08-06 ENCOUNTER — APPOINTMENT (OUTPATIENT)
Dept: RADIOLOGY | Facility: HOSPITAL | Age: 80
End: 2025-08-06
Payer: COMMERCIAL

## 2025-08-06 VITALS
RESPIRATION RATE: 16 BRPM | HEIGHT: 72 IN | DIASTOLIC BLOOD PRESSURE: 71 MMHG | HEART RATE: 81 BPM | TEMPERATURE: 97.5 F | WEIGHT: 173 LBS | OXYGEN SATURATION: 96 % | BODY MASS INDEX: 23.43 KG/M2 | SYSTOLIC BLOOD PRESSURE: 175 MMHG

## 2025-08-06 DIAGNOSIS — R42 DIZZINESS: Primary | ICD-10-CM

## 2025-08-06 LAB
ALBUMIN SERPL BCP-MCNC: 4.1 G/DL (ref 3.4–5)
ALP SERPL-CCNC: 70 U/L (ref 33–136)
ALT SERPL W P-5'-P-CCNC: 48 U/L (ref 7–45)
ANION GAP SERPL CALCULATED.3IONS-SCNC: 11 MMOL/L (ref 10–20)
APPEARANCE UR: CLEAR
AST SERPL W P-5'-P-CCNC: 40 U/L (ref 9–39)
BACTERIA #/AREA URNS AUTO: ABNORMAL /HPF
BASOPHILS # BLD AUTO: 0.04 X10*3/UL (ref 0–0.1)
BASOPHILS NFR BLD AUTO: 0.5 %
BILIRUB SERPL-MCNC: 0.8 MG/DL (ref 0–1.2)
BILIRUB UR STRIP.AUTO-MCNC: NEGATIVE MG/DL
BUN SERPL-MCNC: 14 MG/DL (ref 6–23)
CALCIUM SERPL-MCNC: 9.9 MG/DL (ref 8.6–10.3)
CARDIAC TROPONIN I PNL SERPL HS: 8 NG/L (ref 0–13)
CHLORIDE SERPL-SCNC: 101 MMOL/L (ref 98–107)
CO2 SERPL-SCNC: 30 MMOL/L (ref 21–32)
COLOR UR: YELLOW
CREAT SERPL-MCNC: 0.74 MG/DL (ref 0.5–1.05)
EGFRCR SERPLBLD CKD-EPI 2021: 82 ML/MIN/1.73M*2
EOSINOPHIL # BLD AUTO: 0.12 X10*3/UL (ref 0–0.4)
EOSINOPHIL NFR BLD AUTO: 1.4 %
ERYTHROCYTE [DISTWIDTH] IN BLOOD BY AUTOMATED COUNT: 13.2 % (ref 11.5–14.5)
GLUCOSE SERPL-MCNC: 86 MG/DL (ref 74–99)
GLUCOSE UR STRIP.AUTO-MCNC: NORMAL MG/DL
HCT VFR BLD AUTO: 48 % (ref 36–46)
HGB BLD-MCNC: 15.6 G/DL (ref 12–16)
HYALINE CASTS #/AREA URNS AUTO: ABNORMAL /LPF
IMM GRANULOCYTES # BLD AUTO: 0.02 X10*3/UL (ref 0–0.5)
IMM GRANULOCYTES NFR BLD AUTO: 0.2 % (ref 0–0.9)
KETONES UR STRIP.AUTO-MCNC: NEGATIVE MG/DL
LEUKOCYTE ESTERASE UR QL STRIP.AUTO: ABNORMAL
LYMPHOCYTES # BLD AUTO: 2.9 X10*3/UL (ref 0.8–3)
LYMPHOCYTES NFR BLD AUTO: 34.6 %
MAGNESIUM SERPL-MCNC: 1.37 MG/DL (ref 1.6–2.4)
MCH RBC QN AUTO: 29.2 PG (ref 26–34)
MCHC RBC AUTO-ENTMCNC: 32.5 G/DL (ref 32–36)
MCV RBC AUTO: 90 FL (ref 80–100)
MONOCYTES # BLD AUTO: 0.93 X10*3/UL (ref 0.05–0.8)
MONOCYTES NFR BLD AUTO: 11.1 %
MUCOUS THREADS #/AREA URNS AUTO: ABNORMAL /LPF
NEUTROPHILS # BLD AUTO: 4.37 X10*3/UL (ref 1.6–5.5)
NEUTROPHILS NFR BLD AUTO: 52.2 %
NITRITE UR QL STRIP.AUTO: NEGATIVE
NRBC BLD-RTO: 0 /100 WBCS (ref 0–0)
PH UR STRIP.AUTO: 6.5 [PH]
PLATELET # BLD AUTO: 219 X10*3/UL (ref 150–450)
POTASSIUM SERPL-SCNC: 3.8 MMOL/L (ref 3.5–5.3)
PROT SERPL-MCNC: 7.3 G/DL (ref 6.4–8.2)
PROT UR STRIP.AUTO-MCNC: ABNORMAL MG/DL
RBC # BLD AUTO: 5.34 X10*6/UL (ref 4–5.2)
RBC # UR STRIP.AUTO: NEGATIVE MG/DL
RBC #/AREA URNS AUTO: ABNORMAL /HPF
SODIUM SERPL-SCNC: 138 MMOL/L (ref 136–145)
SP GR UR STRIP.AUTO: 1.02
SQUAMOUS #/AREA URNS AUTO: ABNORMAL /HPF
TSH SERPL-ACNC: 1.92 MIU/L (ref 0.44–3.98)
UROBILINOGEN UR STRIP.AUTO-MCNC: ABNORMAL MG/DL
WBC # BLD AUTO: 8.4 X10*3/UL (ref 4.4–11.3)
WBC #/AREA URNS AUTO: ABNORMAL /HPF

## 2025-08-06 PROCEDURE — 81001 URINALYSIS AUTO W/SCOPE: CPT

## 2025-08-06 PROCEDURE — 84484 ASSAY OF TROPONIN QUANT: CPT

## 2025-08-06 PROCEDURE — 83735 ASSAY OF MAGNESIUM: CPT

## 2025-08-06 PROCEDURE — 36415 COLL VENOUS BLD VENIPUNCTURE: CPT

## 2025-08-06 PROCEDURE — 93005 ELECTROCARDIOGRAM TRACING: CPT

## 2025-08-06 PROCEDURE — 99285 EMERGENCY DEPT VISIT HI MDM: CPT | Mod: 25 | Performed by: EMERGENCY MEDICINE

## 2025-08-06 PROCEDURE — 87086 URINE CULTURE/COLONY COUNT: CPT | Mod: WESLAB

## 2025-08-06 PROCEDURE — 2500000002 HC RX 250 W HCPCS SELF ADMINISTERED DRUGS (ALT 637 FOR MEDICARE OP, ALT 636 FOR OP/ED)

## 2025-08-06 PROCEDURE — 71045 X-RAY EXAM CHEST 1 VIEW: CPT | Performed by: RADIOLOGY

## 2025-08-06 PROCEDURE — 71045 X-RAY EXAM CHEST 1 VIEW: CPT

## 2025-08-06 PROCEDURE — 2500000004 HC RX 250 GENERAL PHARMACY W/ HCPCS (ALT 636 FOR OP/ED)

## 2025-08-06 PROCEDURE — 84443 ASSAY THYROID STIM HORMONE: CPT

## 2025-08-06 PROCEDURE — 96374 THER/PROPH/DIAG INJ IV PUSH: CPT

## 2025-08-06 PROCEDURE — 80053 COMPREHEN METABOLIC PANEL: CPT

## 2025-08-06 PROCEDURE — 85025 COMPLETE CBC W/AUTO DIFF WBC: CPT

## 2025-08-06 PROCEDURE — 96361 HYDRATE IV INFUSION ADD-ON: CPT

## 2025-08-06 PROCEDURE — 2500000002 HC RX 250 W HCPCS SELF ADMINISTERED DRUGS (ALT 637 FOR MEDICARE OP, ALT 636 FOR OP/ED): Performed by: EMERGENCY MEDICINE

## 2025-08-06 PROCEDURE — 2500000004 HC RX 250 GENERAL PHARMACY W/ HCPCS (ALT 636 FOR OP/ED): Performed by: EMERGENCY MEDICINE

## 2025-08-06 RX ORDER — MECLIZINE HYDROCHLORIDE 25 MG/1
25 TABLET ORAL 3 TIMES DAILY PRN
Qty: 12 TABLET | Refills: 0 | Status: SHIPPED | OUTPATIENT
Start: 2025-08-06 | End: 2025-08-13

## 2025-08-06 RX ORDER — MECLIZINE HYDROCHLORIDE 25 MG/1
25 TABLET ORAL ONCE
Status: COMPLETED | OUTPATIENT
Start: 2025-08-06 | End: 2025-08-06

## 2025-08-06 RX ORDER — ONDANSETRON HYDROCHLORIDE 2 MG/ML
4 INJECTION, SOLUTION INTRAVENOUS ONCE
Status: COMPLETED | OUTPATIENT
Start: 2025-08-06 | End: 2025-08-06

## 2025-08-06 RX ADMIN — ONDANSETRON 4 MG: 2 INJECTION, SOLUTION INTRAMUSCULAR; INTRAVENOUS at 21:22

## 2025-08-06 RX ADMIN — SODIUM CHLORIDE 500 ML: 900 INJECTION, SOLUTION INTRAVENOUS at 22:29

## 2025-08-06 RX ADMIN — MECLIZINE HYDROCHLORIDE 25 MG: 25 TABLET ORAL at 22:29

## 2025-08-06 RX ADMIN — SODIUM CHLORIDE 500 ML: 900 INJECTION, SOLUTION INTRAVENOUS at 21:22

## 2025-08-06 RX ADMIN — MECLIZINE HYDROCHLORIDE 25 MG: 25 TABLET ORAL at 21:22

## 2025-08-06 ASSESSMENT — PAIN - FUNCTIONAL ASSESSMENT: PAIN_FUNCTIONAL_ASSESSMENT: 0-10

## 2025-08-06 ASSESSMENT — LIFESTYLE VARIABLES
TOTAL SCORE: 0
HAVE YOU EVER FELT YOU SHOULD CUT DOWN ON YOUR DRINKING: NO
HAVE PEOPLE ANNOYED YOU BY CRITICIZING YOUR DRINKING: NO
EVER FELT BAD OR GUILTY ABOUT YOUR DRINKING: NO
EVER HAD A DRINK FIRST THING IN THE MORNING TO STEADY YOUR NERVES TO GET RID OF A HANGOVER: NO

## 2025-08-06 ASSESSMENT — PAIN SCALES - GENERAL: PAINLEVEL_OUTOF10: 0 - NO PAIN

## 2025-08-07 LAB — BACTERIA UR CULT: NORMAL

## 2025-08-07 NOTE — ED PROVIDER NOTES
----- Message from Nancy Malave NP sent at 5/14/2019  8:38 PM CDT -----  Please inform the patient that all the labs are good with the exception of the triglycerides. He stopped prescription fish oil and is taking OTC fish oil. Please ask him what and how much he is taking, because it is not sufficient. His levels are as high as there were 2 years ago. Last year his triglycerides level was normal.   HPI   Chief Complaint   Patient presents with    Shaking     Patient states all day has been feeling shaky, dizzy, weak and short of breath which is new for her.         HPI  60-year-old female presents with complaint of feeling shaky and sometimes she is a little dizzy and sometimes she has low short of breath.  Patient states symptoms have been on and off throughout the day today.  No chest pain.  No fevers or chills.  No nausea or vomiting.  No diarrhea.  Seems to be worse when she walks around.  She had a remote history of vertigo in the past.  No other complaints.      Patient History   Medical History[1]  Surgical History[2]  Family History[3]  Social History[4]    Physical Exam   ED Triage Vitals [08/06/25 2040]   Temperature Heart Rate Respirations BP   36.4 °C (97.5 °F) 81 16 175/71      Pulse Ox Temp Source Heart Rate Source Patient Position   96 % Temporal Monitor Sitting      BP Location FiO2 (%)     Right arm --       Physical Exam  General:  Awake, alert, no acute distress.  Head: Normocephalic, Atraumatic  Neck: Supple, trachea midline, no stridor  Skin: Warm and dry, no rashes   Lungs: Clear to auscultation bilaterally no acute respiratory distress, speaking in full sentences without difficulty  CV: Regular Rate Rhythm with no obvious murmurs gallops rubs noted, no jugular venous distention, no pedal edema   Neuro:  No gross focal neurologic deficits, NIH is 0  Musculoskeletal:  Full range of motion in all 4 extremities  Psychiatric:  Alert oriented x 3, Good insight into condition.    ED Course & MDM   ED Course as of 08/06/25 2244   Wed Aug 06, 2025   2052 ECG 12 lead  Performed at  2050, HR of 77, NSR, NAD, QTc 502, no sign of STEMI, bigeminy with PVCs, LBBB seen previously on EKGs    Reviewed and interpreted by me at time performed   [JM]      ED Course User Index  [JM] Camille Grider MD         Diagnoses as of 08/06/25 2244   Dizziness                 No data recorded     [place] Coma  Scale Score: 15 (08/06/25 2041 : Elidia Blackwood RN)                           Medical Decision Making  Her EKG does show bigeminy otherwise her workup is unremarkable.  She has left pulm breath but that is not new.  Vital signs are stable otherwise.  Discussed finding with the patient at bedside.  She was treated with a 5 Normal Saline bolus and meclizine as she has some vertiginous type symptoms.  I advised her to follow-up with her doctor but return if condition worsens.  Patient stable for discharge.    Procedure  Procedures       [1]   Past Medical History:  Diagnosis Date    Atherosclerotic heart disease of native coronary artery without angina pectoris 07/09/2021    Coronary artery disease    Essential (primary) hypertension 12/16/2022    Hypertension    Occlusion and stenosis of left carotid artery     Carotid artery, internal, occlusion, left    Peripheral vascular disease, unspecified 01/04/2017    Peripheral vascular disease    Personal history of colonic polyps     History of colonic polyps    Personal history of nicotine dependence     History of nicotine dependence    Personal history of other diseases of the circulatory system 04/15/2014    History of varicose veins    Personal history of other diseases of the circulatory system     History of varicose veins of lower extremity    Personal history of other diseases of the musculoskeletal system and connective tissue 04/15/2014    Personal history of arthritis    Personal history of other diseases of the musculoskeletal system and connective tissue 04/15/2014    History of low back pain    Personal history of other diseases of the nervous system and sense organs     History of carpal tunnel syndrome    Personal history of other diseases of the respiratory system     History of bronchitis    Personal history of other diseases of the respiratory system 05/17/2017    History of bronchitis    Personal history of other endocrine, nutritional and metabolic  disease     History of mixed hyperlipidemia    Personal history of other medical treatment     History of positive PPD    Presbyopia 06/05/2018    Presbyopia    Presbyopia 06/05/2018    Presbyopia    Pure hypercholesterolemia, unspecified 12/16/2022    Hypercholesterolemia    Stricture of artery     Subclavian artery stenosis    Type 2 diabetes mellitus without complications (Multi) 07/09/2020    Diabetes mellitus   [2]   Past Surgical History:  Procedure Laterality Date    APPENDECTOMY  04/15/2014    Appendectomy    CATARACT EXTRACTION  04/15/2014    Cataract Extraction    OTHER SURGICAL HISTORY  06/05/2018    Extracaps Cataract Extract With Prosthesis Insert Left Eye    OTHER SURGICAL HISTORY  06/05/2018    Extracaps Cataract Extract With Prosthesis Insert Right Eye    TUBAL LIGATION  04/15/2014    Tubal Ligation   [3] No family history on file.  [4]   Social History  Tobacco Use    Smoking status: Every Day     Current packs/day: 0.50     Average packs/day: 0.5 packs/day for 61.6 years (30.8 ttl pk-yrs)     Types: Cigarettes     Start date: 1964     Passive exposure: Current    Smokeless tobacco: Never   Substance Use Topics    Alcohol use: Yes     Alcohol/week: 8.0 - 10.0 standard drinks of alcohol     Types: 1 - 2 Glasses of wine, 7 - 8 Standard drinks or equivalent per week    Drug use: Never        Yoandy Bales DO  08/06/25 1884

## 2025-08-07 NOTE — ED TRIAGE NOTES
TRIAGE NOTE   I saw the patient as the Clinician in Triage and performed a brief history and physical exam, established acuity, and ordered appropriate tests to develop basic plan of care. Patient will be seen by an [facility], resident and/or physician who will independently evaluate the patient. Please see subsequent provider notes for further details and disposition.     Brief HPI: In brief, Briana Brown is a 80 y.o. female that presents for multiple medical complaints.  Endorses today she felt dizzy, nauseous.  States it happened after she got out of bed.  She felt like the room was spinning.  She did not follow-up.  States that when she felt dizzy, she also felt like she was going to be sick.  Endorses that she has been tremulous all day.  No hearing loss.  No LOC.  Patient denies fevers, chills, cough, sore throat, runny nose, chest pain, shortness of breath, abdominal pain, vomiting, diarrhea or urinary complaints.  Focused Physical exam:   GENERAL APPEARANCE: This patient is in no acute respiratory distress. Awake and alert. Talking appropriately. Answering questions appropriately. No evidence of pressured speech.    VITAL SIGNS: As per the nurses' triage record.    HEENT: Normocephalic, atraumatic.    NECK:  full gross ROM during exam    MUSCULOSKELETAL:  Full gross active range of motion. Ambulating on own with no acute difficulties    CARDIOVASCULAR:  Heart S1, S2 with regular rate and rhythm.  No murmur, gallops, rubs.  Distal pulses 3+ symmetric bilaterally.  No lower extremity edema     NEUROLOGICAL: Awake, alert and oriented x 3.    IMMUNOLOGICAL: No palpable lymphadenopathy or lymphatic streaking noted on visible skin.    DERM: No petechiae, rashes, or ecchymoses on visible skin    PSYCH: mood and affect appear normal.    Plan/MDM:   Patient is a 80-year-old female presenting with multiple medical complaints.  Lab work, urine, imaging ordered.  Fluids and medication were.  Conditions considered  include but are not limited to: Electrolyte abnormality, UTI, viral illness, vertigo.    Please see subsequent provider note for further details and disposition

## 2025-08-07 NOTE — DISCHARGE INSTRUCTIONS
Thank you for allowing us to care for you today.  You may receive a survey regarding your visit today.    If you were satisfied with the care and service provided we would be very grateful if you would give us a high rating.    Your feedback is very important to us.    Dr. Bales        As of today's visit, based on reasonable likelihood, that it is safe for you to be discharged back to your residence to follow-up as an outpatient for ongoing management of your medical problem. You should follow-up with any referrals / primary provider as soon as possible. The contacts (number, addresses) are listed below.         Important:  Even though we think it is safe for you to go home, there is always a small chance that we are missing something that could require hospitalization.  Therefore it is very important that if you get worse or develops any new symptoms that you return here as soon as possible to be re-evaluated.  This includes return of symptoms that have resolved such as fainting, chest pain, or symptoms that could be warning signs for stroke important:  Even though we think it is safe for you to go home, there is always a small chance that we are missing something that could require hospitalization.  Therefore it is very important that if you get worse or develops any new symptoms that you return here as soon as possible to be re-evaluated.  This includes return of symptoms that have resolved such as fainting, chest pain, or symptoms that could be warning signs for stroke         Make sure your pharmacy and primary doctor is aware of any new medications prescribed today.          It is your responsibility to contact as soon as possible, and follow through with, any referrals you were given today. We do recommend you inform them you are a [facility] follow-up patient, as often they can better accommodate your need to be seen, provided their schedules allow. We will, and have, made every effort to ensure you have  access to adequate follow-up specialists available.          All problems may not be able to be fixed in one ER visit. This is why timely ongoing care is important, and this is a responsibility you share in. Further, you are free to follow up with any provider you choose, and this is not limited to our suggestion.          If cultures were obtained today, you will be contacted should anything result that would require further treatment. Please contact the ED at the number provided with questions.          Having trouble affording medications? Try [email or web address]! (This is not a hospital endorsed website, merely a recommendation based on my own personal experiences with [employer])

## 2025-08-09 LAB
ATRIAL RATE: 77 BPM
P AXIS: 51 DEGREES
P OFFSET: 207 MS
P ONSET: 157 MS
PR INTERVAL: 120 MS
Q ONSET: 217 MS
QRS COUNT: 12 BEATS
QRS DURATION: 130 MS
QT INTERVAL: 444 MS
QTC CALCULATION(BAZETT): 502 MS
QTC FREDERICIA: 482 MS
R AXIS: 18 DEGREES
T AXIS: 169 DEGREES
T OFFSET: 439 MS
VENTRICULAR RATE: 77 BPM

## 2025-08-12 ENCOUNTER — OFFICE VISIT (OUTPATIENT)
Dept: PRIMARY CARE | Facility: CLINIC | Age: 80
End: 2025-08-12
Payer: COMMERCIAL

## 2025-08-12 VITALS
OXYGEN SATURATION: 98 % | SYSTOLIC BLOOD PRESSURE: 82 MMHG | WEIGHT: 174 LBS | BODY MASS INDEX: 23.6 KG/M2 | HEART RATE: 74 BPM | DIASTOLIC BLOOD PRESSURE: 60 MMHG | RESPIRATION RATE: 18 BRPM

## 2025-08-12 DIAGNOSIS — R42 DIZZINESS: Primary | ICD-10-CM

## 2025-08-12 DIAGNOSIS — Z12.39 BREAST SCREENING: ICD-10-CM

## 2025-08-12 DIAGNOSIS — E11.9 CONTROLLED TYPE 2 DIABETES MELLITUS WITHOUT COMPLICATION, WITHOUT LONG-TERM CURRENT USE OF INSULIN: ICD-10-CM

## 2025-08-12 DIAGNOSIS — Z12.11 ENCOUNTER FOR SCREENING FOR MALIGNANT NEOPLASM OF COLON: ICD-10-CM

## 2025-08-12 ASSESSMENT — PATIENT HEALTH QUESTIONNAIRE - PHQ9
2. FEELING DOWN, DEPRESSED OR HOPELESS: NOT AT ALL
SUM OF ALL RESPONSES TO PHQ9 QUESTIONS 1 AND 2: 0
1. LITTLE INTEREST OR PLEASURE IN DOING THINGS: NOT AT ALL

## 2025-08-12 ASSESSMENT — ENCOUNTER SYMPTOMS
LOSS OF SENSATION IN FEET: 1
DEPRESSION: 0
OCCASIONAL FEELINGS OF UNSTEADINESS: 0

## 2025-08-22 ENCOUNTER — HOSPITAL ENCOUNTER (OUTPATIENT)
Dept: RADIOLOGY | Facility: CLINIC | Age: 80
Discharge: HOME | End: 2025-08-22
Payer: COMMERCIAL

## 2025-08-22 VITALS — HEIGHT: 70 IN | BODY MASS INDEX: 24.91 KG/M2 | WEIGHT: 174 LBS

## 2025-08-22 DIAGNOSIS — Z12.39 BREAST SCREENING: ICD-10-CM

## 2025-08-22 DIAGNOSIS — Z12.31 ENCOUNTER FOR SCREENING MAMMOGRAM FOR MALIGNANT NEOPLASM OF BREAST: ICD-10-CM

## 2025-08-22 PROCEDURE — 77067 SCR MAMMO BI INCL CAD: CPT

## 2025-10-14 ENCOUNTER — APPOINTMENT (OUTPATIENT)
Dept: PRIMARY CARE | Facility: CLINIC | Age: 80
End: 2025-10-14
Payer: COMMERCIAL

## 2025-10-30 ENCOUNTER — APPOINTMENT (OUTPATIENT)
Dept: OPHTHALMOLOGY | Facility: CLINIC | Age: 80
End: 2025-10-30
Payer: COMMERCIAL